# Patient Record
Sex: MALE | Race: WHITE | NOT HISPANIC OR LATINO | Employment: OTHER | URBAN - METROPOLITAN AREA
[De-identification: names, ages, dates, MRNs, and addresses within clinical notes are randomized per-mention and may not be internally consistent; named-entity substitution may affect disease eponyms.]

---

## 2017-02-23 ENCOUNTER — HOSPITAL ENCOUNTER (OUTPATIENT)
Dept: FAMILY MEDICINE CLINIC | Facility: CLINIC | Age: 57
Discharge: HOME | End: 2017-02-23
Attending: NURSE PRACTITIONER | Admitting: NURSE PRACTITIONER

## 2017-03-29 ENCOUNTER — HOSPITAL ENCOUNTER (OUTPATIENT)
Dept: FAMILY MEDICINE CLINIC | Facility: CLINIC | Age: 57
Discharge: HOME | End: 2017-03-29
Attending: NURSE PRACTITIONER | Admitting: NURSE PRACTITIONER

## 2017-03-31 ENCOUNTER — HOSPITAL ENCOUNTER (OUTPATIENT)
Dept: FAMILY MEDICINE CLINIC | Facility: CLINIC | Age: 57
Discharge: HOME | End: 2017-03-31
Attending: NURSE PRACTITIONER | Admitting: NURSE PRACTITIONER

## 2018-02-28 ENCOUNTER — HOSPITAL ENCOUNTER (OUTPATIENT)
Dept: FAMILY MEDICINE CLINIC | Facility: CLINIC | Age: 58
Discharge: HOME | End: 2018-02-28
Attending: NURSE PRACTITIONER | Admitting: NURSE PRACTITIONER

## 2019-07-30 RX ORDER — ATORVASTATIN CALCIUM 20 MG/1
20 TABLET, FILM COATED ORAL
Qty: 30 TABLET | Refills: 5 | Status: SHIPPED | OUTPATIENT
Start: 2019-07-30 | End: 2020-01-20

## 2019-07-30 RX ORDER — ATORVASTATIN CALCIUM 20 MG/1
1 TABLET, FILM COATED ORAL
COMMUNITY
Start: 2017-11-11 | End: 2019-07-30 | Stop reason: SDUPTHER

## 2019-08-08 RX ORDER — LISINOPRIL 10 MG/1
1 TABLET ORAL DAILY
COMMUNITY
Start: 2017-11-27 | End: 2019-08-08 | Stop reason: SDUPTHER

## 2019-08-08 RX ORDER — LISINOPRIL 10 MG/1
10 TABLET ORAL DAILY
Qty: 30 TABLET | Refills: 5 | Status: SHIPPED | OUTPATIENT
Start: 2019-08-08 | End: 2020-01-27

## 2019-08-13 RX ORDER — MELOXICAM 15 MG/1
1 TABLET ORAL DAILY
COMMUNITY
Start: 2018-05-18 | End: 2019-08-13 | Stop reason: SDUPTHER

## 2019-08-13 RX ORDER — MELOXICAM 15 MG/1
15 TABLET ORAL DAILY
Qty: 30 TABLET | Refills: 2 | Status: SHIPPED | OUTPATIENT
Start: 2019-08-13 | End: 2019-11-20 | Stop reason: SDUPTHER

## 2019-10-14 RX ORDER — GLIPIZIDE 5 MG/1
0.5 TABLET ORAL DAILY
COMMUNITY
Start: 2017-12-24 | End: 2019-10-14 | Stop reason: SDUPTHER

## 2019-10-14 RX ORDER — GLIPIZIDE 5 MG/1
2.5 TABLET ORAL DAILY
Qty: 45 TABLET | Refills: 1 | Status: SHIPPED | OUTPATIENT
Start: 2019-10-14 | End: 2020-03-04

## 2019-11-20 RX ORDER — MELOXICAM 15 MG/1
TABLET ORAL
Qty: 30 TABLET | Refills: 2 | Status: SHIPPED | OUTPATIENT
Start: 2019-11-20 | End: 2020-02-26 | Stop reason: SDUPTHER

## 2020-01-20 RX ORDER — ATORVASTATIN CALCIUM 20 MG/1
TABLET, FILM COATED ORAL
Qty: 30 TABLET | Refills: 5 | Status: SHIPPED | OUTPATIENT
Start: 2020-01-20 | End: 2020-08-11 | Stop reason: SDUPTHER

## 2020-01-27 RX ORDER — LISINOPRIL 10 MG/1
TABLET ORAL
Qty: 30 TABLET | Refills: 5 | Status: SHIPPED | OUTPATIENT
Start: 2020-01-27 | End: 2020-08-03 | Stop reason: SDUPTHER

## 2020-02-10 RX ORDER — GLIPIZIDE 5 MG/1
TABLET ORAL
Qty: 45 TABLET | Refills: 1 | OUTPATIENT
Start: 2020-02-10

## 2020-02-13 NOTE — TELEPHONE ENCOUNTER
Tried to contact patient- No voicemail set up couldn't leave message, will continue to try to contact patient

## 2020-02-18 RX ORDER — MELOXICAM 15 MG/1
TABLET ORAL
Qty: 30 TABLET | Refills: 2 | OUTPATIENT
Start: 2020-02-18

## 2020-02-19 ENCOUNTER — OFFICE VISIT (OUTPATIENT)
Dept: FAMILY MEDICINE CLINIC | Facility: CLINIC | Age: 60
End: 2020-02-19

## 2020-02-19 VITALS
HEIGHT: 66 IN | DIASTOLIC BLOOD PRESSURE: 85 MMHG | TEMPERATURE: 98.1 F | SYSTOLIC BLOOD PRESSURE: 124 MMHG | OXYGEN SATURATION: 90 % | WEIGHT: 272 LBS | BODY MASS INDEX: 43.71 KG/M2 | HEART RATE: 79 BPM

## 2020-02-19 DIAGNOSIS — K59.00 CONSTIPATION, UNSPECIFIED CONSTIPATION TYPE: ICD-10-CM

## 2020-02-19 DIAGNOSIS — R10.30 LOWER ABDOMINAL PAIN: Primary | ICD-10-CM

## 2020-02-19 PROBLEM — K21.9 GASTROESOPHAGEAL REFLUX DISEASE: Status: ACTIVE | Noted: 2017-03-29

## 2020-02-19 PROBLEM — R51.9 HEADACHE: Status: ACTIVE | Noted: 2017-06-22

## 2020-02-19 PROBLEM — M25.532 WRIST PAIN, LEFT: Status: ACTIVE | Noted: 2018-02-26

## 2020-02-19 PROBLEM — Z12.5 ENCOUNTER FOR SCREENING FOR MALIGNANT NEOPLASM OF PROSTATE: Status: ACTIVE | Noted: 2019-05-09

## 2020-02-19 PROBLEM — M25.569 KNEE PAIN: Status: ACTIVE | Noted: 2017-02-15

## 2020-02-19 PROBLEM — H57.12 EYE PAIN, LEFT: Status: ACTIVE | Noted: 2017-06-22

## 2020-02-19 PROBLEM — R07.81 RIB PAIN: Status: ACTIVE | Noted: 2017-03-29
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
## 2020-02-19 PROCEDURE — 99213 OFFICE O/P EST LOW 20 MIN: CPT | Performed by: NURSE PRACTITIONER

## 2020-02-19 NOTE — PROGRESS NOTES
"    George Damon is a 59 y.o. male.      59-year-old obese white male with history of COPD, CAD/hypertension, type 2 diabetes, CHF, hyperlipidemia who comes in today with complaints of severe abdominal pain lasting 3-4 days that is doubling patient over in the office.  He states he had has not had a bowel movement 2 weeks and normally has daily movements and is not taking anything for constipation. He complains of not being able to eat or drink anything for a couple of days.  I am concerned for bowel obstructions since patient is having such severe pain in his never had issues with constipation before so I am sending him to the ER for evaluation.  Patient's vital signs are stable.  I called the emergency room and give report       The following portions of the patient's history were reviewed and updated as appropriate: allergies, current medications, past family history, past medical history, past social history, past surgical history and problem list.    Vitals:    02/19/20 1015   BP: 124/85   BP Location: Right arm   Patient Position: Sitting   Cuff Size: Large Adult   Pulse: 79   Temp: 98.1 °F (36.7 °C)   TempSrc: Oral   SpO2: 90%   Weight: 123 kg (272 lb)   Height: 167.6 cm (66\")     Body mass index is 43.9 kg/m².    Past Medical History:   Diagnosis Date   • Accidental fall    • Acute respiratory failure with hypercapnia (CMS/HCC)    • CAD (coronary artery disease)    • Congestive heart failure (CMS/HCC)    • COPD with acute exacerbation (CMS/HCC)    • Diabetes mellitus, type II (CMS/HCC)    • Diastolic dysfunction    • Enlarged heart    • Eye pain, left    • GERD (gastroesophageal reflux disease)     (gastric reflex)   • Headache    • Hyperlipidemia    • Hypertension    • Knee pain, left    • Lumbar back pain     With radiculopathy   • Nicotine abuse     Dependence   • Obesity    • Onychauxis    • Pain in joint of right knee    • Pneumonia, bacterial    • Rib pain on right side    • Toe infection    • Wrist " pain, left      Past Surgical History:   Procedure Laterality Date   • CARDIAC CATHETERIZATION  08/18/2015     Family History   Problem Relation Age of Onset   • Diabetes Other    • Cancer Other    • Heart disease Father        There is no immunization history on file for this patient.    Abstract on 02/17/2020   Component Date Value Ref Range Status   • HM EYE EXAM 01/26/2018 SEE REPORT   Final         Review of Systems   Constitutional: Negative.    HENT: Negative.    Respiratory: Negative.    Cardiovascular: Negative.    Gastrointestinal: Positive for abdominal distention, abdominal pain, constipation and nausea.   Musculoskeletal: Negative.    Skin: Negative.    Neurological: Negative.    Psychiatric/Behavioral: Negative.        Objective   Physical Exam   Constitutional: He is oriented to person, place, and time. He appears well-developed and well-nourished.   Cardiovascular: Normal rate and regular rhythm.   Pulmonary/Chest: Effort normal and breath sounds normal.   Abdominal:   Patient has a very large abdomen but it does seem more distended and he is having severe pain that doubles him over.  Has also not had any p.o. Intake for several days   Musculoskeletal: Normal range of motion.   Neurological: He is alert and oriented to person, place, and time.   Skin: Skin is warm and dry.   Psychiatric: He has a normal mood and affect.       Procedures    Assessment/Plan   George was seen today for abdominal pain, diabetes, hypertension, copd, hyperlipidemia and constipation.    Diagnoses and all orders for this visit:    Lower abdominal pain    Constipation, unspecified constipation type          Current Outpatient Medications:   •  atorvastatin (LIPITOR) 20 MG tablet, TAKE 1 TABLET BY MOUTH EVERYDAY AT BEDTIME, Disp: 30 tablet, Rfl: 5  •  glipizide (GLUCOTROL) 5 MG tablet, Take 0.5 tablets by mouth Daily., Disp: 45 tablet, Rfl: 1  •  glucose blood (ACCU-CHEK CHEKO PLUS) test strip, Check BS 3 times a day, Disp:  100 each, Rfl: 5  •  lisinopril (PRINIVIL,ZESTRIL) 10 MG tablet, TAKE 1 TABLET BY MOUTH EVERY DAY, Disp: 30 tablet, Rfl: 5  •  meloxicam (MOBIC) 15 MG tablet, TAKE 1 TABLET BY MOUTH EVERY DAY, Disp: 30 tablet, Rfl: 2  •  insulin aspart (NOVOLOG FLEXPEN) 100 UNIT/ML solution pen-injector sc pen, Sliding Scale, Disp: , Rfl:   •  SITagliptin (JANUVIA) 25 MG tablet, Take 1 tablet by mouth Daily., Disp: , Rfl:

## 2020-02-26 RX ORDER — MELOXICAM 15 MG/1
15 TABLET ORAL DAILY
Qty: 90 TABLET | Refills: 1 | Status: SHIPPED | OUTPATIENT
Start: 2020-02-26 | End: 2020-08-11 | Stop reason: SDUPTHER

## 2020-03-04 RX ORDER — GLIPIZIDE 5 MG/1
TABLET ORAL
Qty: 45 TABLET | Refills: 1 | Status: SHIPPED | OUTPATIENT
Start: 2020-03-04 | End: 2020-08-11 | Stop reason: SDUPTHER

## 2020-03-31 RX ORDER — LANCING DEVICE/LANCETS
KIT MISCELLANEOUS
Qty: 1 KIT | Refills: 0 | Status: SHIPPED | OUTPATIENT
Start: 2020-03-31 | End: 2021-11-10 | Stop reason: SDUPTHER

## 2020-03-31 RX ORDER — LANCING DEVICE/LANCETS
KIT MISCELLANEOUS
COMMUNITY
End: 2020-03-31 | Stop reason: SDUPTHER

## 2020-07-28 RX ORDER — ATORVASTATIN CALCIUM 20 MG/1
TABLET, FILM COATED ORAL
Qty: 30 TABLET | Refills: 5 | OUTPATIENT
Start: 2020-07-28

## 2020-07-28 RX ORDER — LISINOPRIL 10 MG/1
TABLET ORAL
Qty: 30 TABLET | Refills: 5 | OUTPATIENT
Start: 2020-07-28

## 2020-08-01 RX ORDER — LISINOPRIL 10 MG/1
TABLET ORAL
Qty: 30 TABLET | Refills: 5 | OUTPATIENT
Start: 2020-08-01

## 2020-08-03 RX ORDER — LISINOPRIL 10 MG/1
10 TABLET ORAL DAILY
Qty: 30 TABLET | Refills: 0 | Status: SHIPPED | OUTPATIENT
Start: 2020-08-03 | End: 2020-08-11 | Stop reason: SDUPTHER

## 2020-08-03 RX ORDER — LISINOPRIL 10 MG/1
TABLET ORAL
Qty: 30 TABLET | Refills: 5 | OUTPATIENT
Start: 2020-08-03

## 2020-08-11 ENCOUNTER — OFFICE VISIT (OUTPATIENT)
Dept: FAMILY MEDICINE CLINIC | Facility: CLINIC | Age: 60
End: 2020-08-11

## 2020-08-11 VITALS
OXYGEN SATURATION: 88 % | HEIGHT: 66 IN | DIASTOLIC BLOOD PRESSURE: 85 MMHG | SYSTOLIC BLOOD PRESSURE: 123 MMHG | TEMPERATURE: 97.1 F | WEIGHT: 280 LBS | HEART RATE: 62 BPM | BODY MASS INDEX: 45 KG/M2

## 2020-08-11 DIAGNOSIS — Z00.00 PREVENTATIVE HEALTH CARE: ICD-10-CM

## 2020-08-11 DIAGNOSIS — Z79.4 TYPE 2 DIABETES MELLITUS WITH HYPERGLYCEMIA, WITH LONG-TERM CURRENT USE OF INSULIN (HCC): ICD-10-CM

## 2020-08-11 DIAGNOSIS — E11.65 TYPE 2 DIABETES MELLITUS WITH HYPERGLYCEMIA, WITH LONG-TERM CURRENT USE OF INSULIN (HCC): ICD-10-CM

## 2020-08-11 DIAGNOSIS — I10 ESSENTIAL HYPERTENSION: ICD-10-CM

## 2020-08-11 DIAGNOSIS — Z12.5 SCREENING PSA (PROSTATE SPECIFIC ANTIGEN): ICD-10-CM

## 2020-08-11 DIAGNOSIS — F17.219 CIGARETTE NICOTINE DEPENDENCE WITH NICOTINE-INDUCED DISORDER: ICD-10-CM

## 2020-08-11 DIAGNOSIS — E78.2 MIXED HYPERLIPIDEMIA: Primary | ICD-10-CM

## 2020-08-11 PROCEDURE — 99214 OFFICE O/P EST MOD 30 MIN: CPT | Performed by: NURSE PRACTITIONER

## 2020-08-11 RX ORDER — LISINOPRIL 10 MG/1
10 TABLET ORAL DAILY
Qty: 90 TABLET | Refills: 1 | Status: SHIPPED | OUTPATIENT
Start: 2020-08-11 | End: 2021-01-23

## 2020-08-11 RX ORDER — ALBUTEROL SULFATE 90 UG/1
2 AEROSOL, METERED RESPIRATORY (INHALATION) EVERY 4 HOURS PRN
Qty: 3 INHALER | Refills: 1 | Status: SHIPPED | OUTPATIENT
Start: 2020-08-11 | End: 2021-11-10 | Stop reason: SDUPTHER

## 2020-08-11 RX ORDER — ATORVASTATIN CALCIUM 20 MG/1
20 TABLET, FILM COATED ORAL
Qty: 90 TABLET | Refills: 1 | Status: SHIPPED | OUTPATIENT
Start: 2020-08-11 | End: 2021-05-17

## 2020-08-11 RX ORDER — MELOXICAM 15 MG/1
15 TABLET ORAL DAILY
Qty: 90 TABLET | Refills: 1 | Status: SHIPPED | OUTPATIENT
Start: 2020-08-11 | End: 2021-11-10

## 2020-08-11 RX ORDER — GLIPIZIDE 5 MG/1
2.5 TABLET ORAL DAILY
Qty: 45 TABLET | Refills: 1 | Status: SHIPPED | OUTPATIENT
Start: 2020-08-11 | End: 2021-03-03

## 2020-08-11 RX ORDER — ALBUTEROL SULFATE 90 UG/1
2 AEROSOL, METERED RESPIRATORY (INHALATION) EVERY 4 HOURS PRN
COMMUNITY
End: 2020-08-11 | Stop reason: SDUPTHER

## 2020-08-11 NOTE — PATIENT INSTRUCTIONS
Fasting blood work today  Diet and exercise as discussed  Follow-up fasting visit 6 months  Stop smoking , consider smoking cessation

## 2020-08-11 NOTE — PROGRESS NOTES
"    George Damon is a 60 y.o. male.     61 yo obese white male smoker with hx of diabetes, copd, cad/htn, chf, hyperlipidemia who comes in today for f/u visit and fasting blood work  Blood pressure 122/84 heart rate 62 he denies any chest pain, dyspnea, tachycardia or dizziness  Patient states blood sugars are usually under 130 fasting in the morning his last A1c was 7.1 fasting blood sugar 166  Weight is up 8 pounds at 280 we discussed diet and weight loss but patient is not real compliant with diet  He is up-to-date on eye exams I am doing a PSA today he still refuses colonoscopies    Fasting blood work  Follow-up 6 months fasting   diet and exercise as discussed         The following portions of the patient's history were reviewed and updated as appropriate: allergies, current medications, past family history, past medical history, past social history, past surgical history and problem list.    Vitals:    08/11/20 0809   BP: 123/85   BP Location: Right arm   Patient Position: Sitting   Cuff Size: Large Adult   Pulse: 62   Temp: 97.1 °F (36.2 °C)   TempSrc: Temporal   SpO2: (!) 88%   Weight: 127 kg (280 lb)   Height: 167.6 cm (66\")     Body mass index is 45.19 kg/m².    Past Medical History:   Diagnosis Date   • Accidental fall    • Acute respiratory failure with hypercapnia (CMS/HCC)    • CAD (coronary artery disease)    • Congestive heart failure (CMS/HCC)    • COPD with acute exacerbation (CMS/HCC)    • Diabetes mellitus, type II (CMS/HCC)    • Diastolic dysfunction    • Enlarged heart    • Eye pain, left    • GERD (gastroesophageal reflux disease)     (gastric reflex)   • Headache    • Hyperlipidemia    • Hypertension    • Knee pain, left    • Lumbar back pain     With radiculopathy   • Nicotine abuse     Dependence   • Obesity    • Onychauxis    • Pain in joint of right knee    • Pneumonia, bacterial    • Rib pain on right side    • Toe infection    • Wrist pain, left      Past Surgical History: "   Procedure Laterality Date   • CARDIAC CATHETERIZATION  08/18/2015     Family History   Problem Relation Age of Onset   • Diabetes Other    • Cancer Other    • Heart disease Father        There is no immunization history on file for this patient.    Abstract on 02/17/2020   Component Date Value Ref Range Status   • HM EYE EXAM 01/26/2018 SEE REPORT   Final         Review of Systems   Constitutional: Negative.    HENT: Negative.    Respiratory: Negative.    Cardiovascular: Negative.    Gastrointestinal: Negative.    Genitourinary: Negative.    Musculoskeletal: Negative.    Neurological: Negative.    Psychiatric/Behavioral: Negative.        Objective   Physical Exam   Constitutional: He is oriented to person, place, and time. He appears well-developed and well-nourished.   Cardiovascular: Normal rate and regular rhythm.   Pulmonary/Chest: Effort normal and breath sounds normal.   Abdominal: Soft. Bowel sounds are normal.   Musculoskeletal: Normal range of motion.   Neurological: He is alert and oriented to person, place, and time.   Skin: Skin is warm and dry.   Psychiatric: He has a normal mood and affect.       Procedures    Assessment/Plan   George was seen today for abdominal pain.    Diagnoses and all orders for this visit:    Mixed hyperlipidemia  -     Lipid Panel With LDL / HDL Ratio    Type 2 diabetes mellitus with hyperglycemia, with long-term current use of insulin (CMS/[facility])  -     Comprehensive Metabolic Panel  -     Hemoglobin A1c    Screening PSA (prostate specific antigen)  -     PSA Screen    Preventative health care  -     CBC & Differential    Essential hypertension    Cigarette nicotine dependence with nicotine-induced disorder    Body mass index (BMI) 45.0-49.9, adult (CMS/[facility])    Other orders  -     lisinopril (PRINIVIL,ZESTRIL) 10 MG tablet; Take 1 tablet by mouth Daily.  -     atorvastatin (LIPITOR) 20 MG tablet; Take 1 tablet by mouth every night at bedtime.  -     glipizide (GLUCOTROL) 5 MG  tablet; Take 0.5 tablets by mouth Daily.  -     meloxicam (MOBIC) 15 MG tablet; Take 1 tablet by mouth Daily.  -     SITagliptin (Januvia) 25 MG tablet; Take 1 tablet by mouth Daily.  -     albuterol sulfate  (90 Base) MCG/ACT inhaler; Inhale 2 puffs Every 4 (Four) Hours As Needed for Wheezing.          Current Outpatient Medications:   •  albuterol sulfate  (90 Base) MCG/ACT inhaler, Inhale 2 puffs Every 4 (Four) Hours As Needed for Wheezing., Disp: 3 inhaler, Rfl: 1  •  atorvastatin (LIPITOR) 20 MG tablet, Take 1 tablet by mouth every night at bedtime., Disp: 90 tablet, Rfl: 1  •  glipizide (GLUCOTROL) 5 MG tablet, Take 0.5 tablets by mouth Daily., Disp: 45 tablet, Rfl: 1  •  glucose blood (Accu-Chek Ania Plus) test strip, USE TO CHECK BLOOD SUGAR THREE TIMES A DAY, Disp: 300 each, Rfl: 5  •  insulin aspart (NOVOLOG FLEXPEN) 100 UNIT/ML solution pen-injector sc pen, Sliding Scale, Disp: , Rfl:   •  Insulin Pen Needle (B-D ULTRAFINE III SHORT PEN) 31G X 8 MM misc, Use as directed with Insulin, Disp: 300 each, Rfl: 2  •  Lancets Misc. (ACCU-CHEK FASTCLIX LANCET) kit, Accu-Chek Fastclix Lancet Device Check BS 3 times a day, Disp: 1 kit, Rfl: 0  •  lisinopril (PRINIVIL,ZESTRIL) 10 MG tablet, Take 1 tablet by mouth Daily., Disp: 90 tablet, Rfl: 1  •  meloxicam (MOBIC) 15 MG tablet, Take 1 tablet by mouth Daily., Disp: 90 tablet, Rfl: 1  •  SITagliptin (Januvia) 25 MG tablet, Take 1 tablet by mouth Daily., Disp: 90 tablet, Rfl: 1

## 2020-08-12 LAB
ALBUMIN SERPL-MCNC: 4.5 G/DL (ref 3.8–4.9)
ALBUMIN/GLOB SERPL: 2.1 {RATIO} (ref 1.2–2.2)
ALP SERPL-CCNC: 102 IU/L (ref 39–117)
ALT SERPL-CCNC: 13 IU/L (ref 0–44)
AST SERPL-CCNC: 17 IU/L (ref 0–40)
BASOPHILS # BLD AUTO: 0 X10E3/UL (ref 0–0.2)
BASOPHILS NFR BLD AUTO: 0 %
BILIRUB SERPL-MCNC: <0.2 MG/DL (ref 0–1.2)
BUN SERPL-MCNC: 11 MG/DL (ref 6–24)
BUN/CREAT SERPL: 15 (ref 9–20)
CALCIUM SERPL-MCNC: 9.9 MG/DL (ref 8.7–10.2)
CHLORIDE SERPL-SCNC: 102 MMOL/L (ref 96–106)
CHOLEST SERPL-MCNC: 143 MG/DL (ref 100–199)
CO2 SERPL-SCNC: 28 MMOL/L (ref 20–29)
CREAT SERPL-MCNC: 0.73 MG/DL (ref 0.76–1.27)
EOSINOPHIL # BLD AUTO: 0.1 X10E3/UL (ref 0–0.4)
EOSINOPHIL NFR BLD AUTO: 2 %
ERYTHROCYTE [DISTWIDTH] IN BLOOD BY AUTOMATED COUNT: 15.3 % (ref 11.6–15.4)
GLOBULIN SER CALC-MCNC: 2.1 G/DL (ref 1.5–4.5)
GLUCOSE SERPL-MCNC: 95 MG/DL (ref 65–99)
HBA1C MFR BLD: 7.3 % (ref 4.8–5.6)
HCT VFR BLD AUTO: 44.5 % (ref 37.5–51)
HDLC SERPL-MCNC: 49 MG/DL
HGB BLD-MCNC: 14.4 G/DL (ref 13–17.7)
IMM GRANULOCYTES # BLD AUTO: 0 X10E3/UL (ref 0–0.1)
IMM GRANULOCYTES NFR BLD AUTO: 0 %
LDLC SERPL CALC-MCNC: 63 MG/DL (ref 0–99)
LDLC/HDLC SERPL: 1.3 RATIO (ref 0–3.6)
LYMPHOCYTES # BLD AUTO: 1.4 X10E3/UL (ref 0.7–3.1)
LYMPHOCYTES NFR BLD AUTO: 24 %
MCH RBC QN AUTO: 28.2 PG (ref 26.6–33)
MCHC RBC AUTO-ENTMCNC: 32.4 G/DL (ref 31.5–35.7)
MCV RBC AUTO: 87 FL (ref 79–97)
MONOCYTES # BLD AUTO: 0.5 X10E3/UL (ref 0.1–0.9)
MONOCYTES NFR BLD AUTO: 9 %
NEUTROPHILS # BLD AUTO: 3.8 X10E3/UL (ref 1.4–7)
NEUTROPHILS NFR BLD AUTO: 65 %
PLATELET # BLD AUTO: 251 X10E3/UL (ref 150–450)
POTASSIUM SERPL-SCNC: 5 MMOL/L (ref 3.5–5.2)
PROT SERPL-MCNC: 6.6 G/DL (ref 6–8.5)
PSA SERPL-MCNC: 0.2 NG/ML (ref 0–4)
RBC # BLD AUTO: 5.1 X10E6/UL (ref 4.14–5.8)
SODIUM SERPL-SCNC: 143 MMOL/L (ref 134–144)
TRIGL SERPL-MCNC: 156 MG/DL (ref 0–149)
VLDLC SERPL CALC-MCNC: 31 MG/DL (ref 5–40)
WBC # BLD AUTO: 6 X10E3/UL (ref 3.4–10.8)

## 2021-01-23 RX ORDER — LISINOPRIL 10 MG/1
TABLET ORAL
Qty: 90 TABLET | Refills: 1 | Status: SHIPPED | OUTPATIENT
Start: 2021-01-23 | End: 2021-11-10 | Stop reason: SDUPTHER

## 2021-03-03 RX ORDER — GLIPIZIDE 5 MG/1
2.5 TABLET ORAL DAILY
Qty: 15 TABLET | Refills: 0 | Status: SHIPPED | OUTPATIENT
Start: 2021-03-03 | End: 2021-11-10 | Stop reason: SDUPTHER

## 2021-05-17 RX ORDER — ATORVASTATIN CALCIUM 20 MG/1
20 TABLET, FILM COATED ORAL
Qty: 30 TABLET | Refills: 0 | Status: SHIPPED | OUTPATIENT
Start: 2021-05-17 | End: 2021-11-10 | Stop reason: SDUPTHER

## 2021-05-17 RX ORDER — BLOOD SUGAR DIAGNOSTIC
STRIP MISCELLANEOUS
Qty: 100 EACH | Refills: 17 | Status: SHIPPED | OUTPATIENT
Start: 2021-05-17 | End: 2021-11-10 | Stop reason: SDUPTHER

## 2021-06-10 PROBLEM — E11.9 DIABETES MELLITUS TYPE 2, INSULIN DEPENDENT: Status: ACTIVE | Noted: 2021-06-10

## 2021-06-10 PROBLEM — Z79.4 DIABETES MELLITUS TYPE 2, INSULIN DEPENDENT (HCC): Status: ACTIVE | Noted: 2021-06-10

## 2021-08-05 RX ORDER — ATORVASTATIN CALCIUM 20 MG/1
TABLET, FILM COATED ORAL
Qty: 30 TABLET | Refills: 0 | OUTPATIENT
Start: 2021-08-05

## 2021-08-05 RX ORDER — GLIPIZIDE 5 MG/1
2.5 TABLET ORAL DAILY
Qty: 15 TABLET | Refills: 0 | OUTPATIENT
Start: 2021-08-05

## 2021-08-22 RX ORDER — ATORVASTATIN CALCIUM 20 MG/1
TABLET, FILM COATED ORAL
Qty: 30 TABLET | Refills: 0 | OUTPATIENT
Start: 2021-08-22

## 2021-08-22 RX ORDER — GLIPIZIDE 5 MG/1
2.5 TABLET ORAL DAILY
Qty: 15 TABLET | Refills: 0 | OUTPATIENT
Start: 2021-08-22

## 2021-10-04 RX ORDER — LISINOPRIL 10 MG/1
TABLET ORAL
Qty: 90 TABLET | Refills: 1 | OUTPATIENT
Start: 2021-10-04

## 2021-11-10 ENCOUNTER — OFFICE VISIT (OUTPATIENT)
Dept: FAMILY MEDICINE CLINIC | Facility: CLINIC | Age: 61
End: 2021-11-10

## 2021-11-10 VITALS
SYSTOLIC BLOOD PRESSURE: 147 MMHG | HEIGHT: 66 IN | TEMPERATURE: 97.3 F | HEART RATE: 71 BPM | OXYGEN SATURATION: 90 % | DIASTOLIC BLOOD PRESSURE: 84 MMHG | BODY MASS INDEX: 43.1 KG/M2 | WEIGHT: 268.2 LBS

## 2021-11-10 DIAGNOSIS — Z79.4 DIABETES MELLITUS TYPE 2, INSULIN DEPENDENT (HCC): ICD-10-CM

## 2021-11-10 DIAGNOSIS — Z12.5 SCREENING PSA (PROSTATE SPECIFIC ANTIGEN): ICD-10-CM

## 2021-11-10 DIAGNOSIS — E11.9 DIABETES MELLITUS TYPE 2, INSULIN DEPENDENT (HCC): ICD-10-CM

## 2021-11-10 DIAGNOSIS — E78.2 MIXED HYPERLIPIDEMIA: Primary | ICD-10-CM

## 2021-11-10 DIAGNOSIS — F17.219 CIGARETTE NICOTINE DEPENDENCE WITH NICOTINE-INDUCED DISORDER: ICD-10-CM

## 2021-11-10 DIAGNOSIS — Z00.00 PREVENTATIVE HEALTH CARE: ICD-10-CM

## 2021-11-10 PROCEDURE — 99396 PREV VISIT EST AGE 40-64: CPT | Performed by: NURSE PRACTITIONER

## 2021-11-10 RX ORDER — ALBUTEROL SULFATE 90 UG/1
2 AEROSOL, METERED RESPIRATORY (INHALATION) EVERY 4 HOURS PRN
Qty: 18.2 G | Refills: 2 | Status: SHIPPED | OUTPATIENT
Start: 2021-11-10 | End: 2022-08-01 | Stop reason: SDUPTHER

## 2021-11-10 RX ORDER — ATORVASTATIN CALCIUM 20 MG/1
20 TABLET, FILM COATED ORAL
Qty: 90 TABLET | Refills: 1 | Status: SHIPPED | OUTPATIENT
Start: 2021-11-10 | End: 2022-05-12

## 2021-11-10 RX ORDER — PEN NEEDLE, DIABETIC 31 GX5/16"
NEEDLE, DISPOSABLE MISCELLANEOUS
Qty: 300 EACH | Refills: 2 | Status: SHIPPED | OUTPATIENT
Start: 2021-11-10

## 2021-11-10 RX ORDER — GLIPIZIDE 5 MG/1
TABLET ORAL
Qty: 45 TABLET | Refills: 1 | Status: SHIPPED | OUTPATIENT
Start: 2021-11-10 | End: 2022-04-04

## 2021-11-10 RX ORDER — LANCING DEVICE/LANCETS
KIT MISCELLANEOUS
Qty: 1 KIT | Refills: 0 | Status: SHIPPED | OUTPATIENT
Start: 2021-11-10 | End: 2022-08-09

## 2021-11-10 RX ORDER — LISINOPRIL 10 MG/1
10 TABLET ORAL DAILY
Qty: 90 TABLET | Refills: 1 | Status: SHIPPED | OUTPATIENT
Start: 2021-11-10 | End: 2022-05-12

## 2021-11-10 RX ORDER — BLOOD SUGAR DIAGNOSTIC
STRIP MISCELLANEOUS
Qty: 100 EACH | Refills: 17 | Status: SHIPPED | OUTPATIENT
Start: 2021-11-10 | End: 2022-08-01 | Stop reason: SDUPTHER

## 2021-11-10 NOTE — PROGRESS NOTES
"    George Damon is a 61 y.o. male.     61-year-old obese white male smoker with history of diabetes, COPD, CAD/hypertension, CHF, hyperlipidemia who comes in today for annual visit.  Patient has been out of medications for 4 to 5 months stating he was unable to get through the office to make an appointment    Blood pressure 146/84 heart rate 70 he denies any chest pain, dyspnea, tachycardia or dizziness    Patient states blood sugars have been higher obviously being off his glipizide.  His last A1c was 7.3 fasting blood sugar 95 and triglycerides 156 I will be reordering all medicines today as  wellness fasting blood work    Patient is down 12 pounds with a weight of 268 and a BMI of 43.3 and he states he has been trying to lose weight    Patient has not got Covid vaccines he is up-to-date on eye exam I am doing a PSA today and he still refuses colonoscopy or Cologuard              Fasting blood work  Monitor blood sugars closely  Reconsider Covid vaccine and colonoscopy  Make 6-month follow-up appointment today       The following portions of the patient's history were reviewed and updated as appropriate: allergies, current medications, past family history, past medical history, past social history, past surgical history and problem list.    Vitals:    11/10/21 1157   BP: 147/84   BP Location: Right arm   Patient Position: Sitting   Cuff Size: Large Adult   Pulse: 71   Temp: 97.3 °F (36.3 °C)   TempSrc: Temporal   SpO2: 90%   Weight: 122 kg (268 lb 3.2 oz)   Height: 167.6 cm (66\")     Body mass index is 43.29 kg/m².    Past Medical History:   Diagnosis Date   • Accidental fall    • Acute respiratory failure with hypercapnia (HCC)    • CAD (coronary artery disease)    • Congestive heart failure (HCC)    • COPD with acute exacerbation (HCC)    • Diabetes mellitus, type II (HCC)    • Diastolic dysfunction    • Enlarged heart    • Eye pain, left    • GERD (gastroesophageal reflux disease)     (gastric reflex)   • " Headache    • Hyperlipidemia    • Hypertension    • Knee pain, left    • Lumbar back pain     With radiculopathy   • Nicotine abuse     Dependence   • Obesity    • Onychauxis    • Pain in joint of right knee    • Pneumonia, bacterial    • Rib pain on right side    • Toe infection    • Wrist pain, left      Past Surgical History:   Procedure Laterality Date   • CARDIAC CATHETERIZATION  08/18/2015     Family History   Problem Relation Age of Onset   • Diabetes Other    • Cancer Other    • Heart disease Father        There is no immunization history on file for this patient.    Office Visit on 08/11/2020   Component Date Value Ref Range Status   • WBC 08/11/2020 6.0  3.4 - 10.8 x10E3/uL Final   • RBC 08/11/2020 5.10  4.14 - 5.80 x10E6/uL Final   • Hemoglobin 08/11/2020 14.4  13.0 - 17.7 g/dL Final   • Hematocrit 08/11/2020 44.5  37.5 - 51.0 % Final   • MCV 08/11/2020 87  79 - 97 fL Final   • MCH 08/11/2020 28.2  26.6 - 33.0 pg Final   • MCHC 08/11/2020 32.4  31.5 - 35.7 g/dL Final   • RDW 08/11/2020 15.3  11.6 - 15.4 % Final   • Platelets 08/11/2020 251  150 - 450 x10E3/uL Final   • Neutrophil Rel % 08/11/2020 65  Not Estab. % Final   • Lymphocyte Rel % 08/11/2020 24  Not Estab. % Final   • Monocyte Rel % 08/11/2020 9  Not Estab. % Final   • Eosinophil Rel % 08/11/2020 2  Not Estab. % Final   • Basophil Rel % 08/11/2020 0  Not Estab. % Final   • Neutrophils Absolute 08/11/2020 3.8  1.4 - 7.0 x10E3/uL Final   • Lymphocytes Absolute 08/11/2020 1.4  0.7 - 3.1 x10E3/uL Final   • Monocytes Absolute 08/11/2020 0.5  0.1 - 0.9 x10E3/uL Final   • Eosinophils Absolute 08/11/2020 0.1  0.0 - 0.4 x10E3/uL Final   • Basophils Absolute 08/11/2020 0.0  0.0 - 0.2 x10E3/uL Final   • Immature Granulocyte Rel % 08/11/2020 0  Not Estab. % Final   • Immature Grans Absolute 08/11/2020 0.0  0.0 - 0.1 x10E3/uL Final   • Glucose 08/11/2020 95  65 - 99 mg/dL Final   • BUN 08/11/2020 11  6 - 24 mg/dL Final   • Creatinine 08/11/2020 0.73* 0.76 -  1.27 mg/dL Final   • eGFR Non African Am 08/11/2020 102  >59 mL/min/1.73 Final   • eGFR African Am 08/11/2020 118  >59 mL/min/1.73 Final   • BUN/Creatinine Ratio 08/11/2020 15  9 - 20 Final   • Sodium 08/11/2020 143  134 - 144 mmol/L Final   • Potassium 08/11/2020 5.0  3.5 - 5.2 mmol/L Final   • Chloride 08/11/2020 102  96 - 106 mmol/L Final   • Total CO2 08/11/2020 28  20 - 29 mmol/L Final   • Calcium 08/11/2020 9.9  8.7 - 10.2 mg/dL Final   • Total Protein 08/11/2020 6.6  6.0 - 8.5 g/dL Final   • Albumin 08/11/2020 4.5  3.8 - 4.9 g/dL Final   • Globulin 08/11/2020 2.1  1.5 - 4.5 g/dL Final   • A/G Ratio 08/11/2020 2.1  1.2 - 2.2 Final   • Total Bilirubin 08/11/2020 <0.2  0.0 - 1.2 mg/dL Final   • Alkaline Phosphatase 08/11/2020 102  39 - 117 IU/L Final   • AST (SGOT) 08/11/2020 17  0 - 40 IU/L Final   • ALT (SGPT) 08/11/2020 13  0 - 44 IU/L Final   • Total Cholesterol 08/11/2020 143  100 - 199 mg/dL Final   • Triglycerides 08/11/2020 156* 0 - 149 mg/dL Final   • HDL Cholesterol 08/11/2020 49  >39 mg/dL Final   • VLDL Cholesterol 08/11/2020 31  5 - 40 mg/dL Final   • LDL Cholesterol  08/11/2020 63  0 - 99 mg/dL Final   • LDL/HDL Ratio 08/11/2020 1.3  0.0 - 3.6 ratio Final    Comment:                                     LDL/HDL Ratio                                              Men  Women                                1/2 Avg.Risk  1.0    1.5                                    Avg.Risk  3.6    3.2                                 2X Avg.Risk  6.2    5.0                                 3X Avg.Risk  8.0    6.1     • Hemoglobin A1C 08/11/2020 7.3* 4.8 - 5.6 % Final    Comment:          Prediabetes: 5.7 - 6.4           Diabetes: >6.4           Glycemic control for adults with diabetes: <7.0     • PSA 08/11/2020 0.2  0.0 - 4.0 ng/mL Final    Comment: Roche ECLIA methodology.  According to the [employer] Association, Serum PSA should  decrease and remain at undetectable levels after radical  prostatectomy. The  AUA defines biochemical recurrence as an initial  PSA value 0.2 ng/mL or greater followed by a subsequent confirmatory  PSA value 0.2 ng/mL or greater.  Values obtained with different assay methods or kits cannot be used  interchangeably. Results cannot be interpreted as absolute evidence  of the presence or absence of malignant disease.           Review of Systems   Constitutional: Negative.    HENT: Negative.    Respiratory: Negative.    Cardiovascular: Negative.    Gastrointestinal: Negative.    Genitourinary: Negative.    Skin: Negative.    Neurological: Negative.    Psychiatric/Behavioral: Negative.        Objective   Physical Exam  Constitutional:       Appearance: Normal appearance.   HENT:      Head: Normocephalic.   Cardiovascular:      Rate and Rhythm: Normal rate and regular rhythm.      Pulses: Normal pulses.      Heart sounds: Normal heart sounds.   Pulmonary:      Effort: Pulmonary effort is normal.      Comments: Lungs are little diminished  Abdominal:      General: Bowel sounds are normal.   Musculoskeletal:         General: Normal range of motion.   Skin:     General: Skin is warm and dry.   Neurological:      Mental Status: He is alert and oriented to person, place, and time.   Psychiatric:         Mood and Affect: Mood normal.         Behavior: Behavior normal.         Procedures    Assessment/Plan   Diagnoses and all orders for this visit:    1. Mixed hyperlipidemia (Primary)  -     Lipid Panel With LDL / HDL Ratio    2. Diabetes mellitus type 2, insulin dependent (HCC)  -     Comprehensive Metabolic Panel  -     Hemoglobin A1c    3. Preventative health care  -     CBC & Differential    4. Screening PSA (prostate specific antigen)  -     PSA Screen    5. Cigarette nicotine dependence with nicotine-induced disorder    Other orders  -     albuterol sulfate  (90 Base) MCG/ACT inhaler; Inhale 2 puffs Every 4 (Four) Hours As Needed for Wheezing.  Dispense: 18.2 g; Refill: 2  -     atorvastatin  (LIPITOR) 20 MG tablet; Take 1 tablet by mouth every night at bedtime. No further refills until seen in office with fasting blood work  Dispense: 90 tablet; Refill: 1  -     glipizide (GLUCOTROL) 5 MG tablet; 1/2 tablet once a day  Dispense: 45 tablet; Refill: 1  -     glucose blood (Accu-Chek Ania Plus) test strip; USE TO CHECK BLOOD SUGAR THREE TIMES A DAY  Dispense: 100 each; Refill: 17  -     Insulin Pen Needle (B-D ULTRAFINE III SHORT PEN) 31G X 8 MM misc; Use as directed with Insulin  Dispense: 300 each; Refill: 2  -     Lancets Misc. (Accu-Chek FastClix Lancet) kit; Accu-Chek Fastclix Lancet Device Check BS 3 times a day  Dispense: 1 kit; Refill: 0  -     lisinopril (PRINIVIL,ZESTRIL) 10 MG tablet; Take 1 tablet by mouth Daily.  Dispense: 90 tablet; Refill: 1          Current Outpatient Medications:   •  albuterol sulfate  (90 Base) MCG/ACT inhaler, Inhale 2 puffs Every 4 (Four) Hours As Needed for Wheezing., Disp: 18.2 g, Rfl: 2  •  atorvastatin (LIPITOR) 20 MG tablet, Take 1 tablet by mouth every night at bedtime. No further refills until seen in office with fasting blood work, Disp: 90 tablet, Rfl: 1  •  glipizide (GLUCOTROL) 5 MG tablet, 1/2 tablet once a day, Disp: 45 tablet, Rfl: 1  •  glucose blood (Accu-Chek Ania Plus) test strip, USE TO CHECK BLOOD SUGAR THREE TIMES A DAY, Disp: 100 each, Rfl: 17  •  insulin aspart (NOVOLOG FLEXPEN) 100 UNIT/ML solution pen-injector sc pen, Sliding Scale, Disp: , Rfl:   •  Insulin Pen Needle (B-D ULTRAFINE III SHORT PEN) 31G X 8 MM misc, Use as directed with Insulin, Disp: 300 each, Rfl: 2  •  Lancets Misc. (Accu-Chek FastClix Lancet) kit, Accu-Chek Fastclix Lancet Device Check BS 3 times a day, Disp: 1 kit, Rfl: 0  •  lisinopril (PRINIVIL,ZESTRIL) 10 MG tablet, Take 1 tablet by mouth Daily., Disp: 90 tablet, Rfl: 1

## 2021-11-10 NOTE — PATIENT INSTRUCTIONS
Fasting blood work  Monitor blood sugars closely  Reconsider Covid vaccine and colonoscopy  Make 6-month follow-up appointment today

## 2021-11-11 DIAGNOSIS — N28.9 KIDNEY LESION, NATIVE, RIGHT: Primary | ICD-10-CM

## 2021-11-11 LAB
ALBUMIN SERPL-MCNC: 4.5 G/DL (ref 3.8–4.9)
ALBUMIN/GLOB SERPL: 2.1 {RATIO} (ref 1.2–2.2)
ALP SERPL-CCNC: 89 IU/L (ref 44–121)
ALT SERPL-CCNC: 14 IU/L (ref 0–44)
AST SERPL-CCNC: 15 IU/L (ref 0–40)
BASOPHILS # BLD AUTO: 0 X10E3/UL (ref 0–0.2)
BASOPHILS NFR BLD AUTO: 1 %
BILIRUB SERPL-MCNC: 0.3 MG/DL (ref 0–1.2)
BUN SERPL-MCNC: 8 MG/DL (ref 6–24)
BUN/CREAT SERPL: 12 (ref 9–20)
CALCIUM SERPL-MCNC: 9.4 MG/DL (ref 8.7–10.2)
CHLORIDE SERPL-SCNC: 102 MMOL/L (ref 96–106)
CHOLEST SERPL-MCNC: 159 MG/DL (ref 100–199)
CO2 SERPL-SCNC: 21 MMOL/L (ref 20–29)
CREAT SERPL-MCNC: 0.68 MG/DL (ref 0.76–1.27)
EOSINOPHIL # BLD AUTO: 0.1 X10E3/UL (ref 0–0.4)
EOSINOPHIL NFR BLD AUTO: 1 %
ERYTHROCYTE [DISTWIDTH] IN BLOOD BY AUTOMATED COUNT: 13.5 % (ref 11.6–15.4)
GLOBULIN SER CALC-MCNC: 2.1 G/DL (ref 1.5–4.5)
GLUCOSE SERPL-MCNC: 177 MG/DL (ref 65–99)
HBA1C MFR BLD: 7.8 % (ref 4.8–5.6)
HCT VFR BLD AUTO: 44.4 % (ref 37.5–51)
HDLC SERPL-MCNC: 48 MG/DL
HGB BLD-MCNC: 15.1 G/DL (ref 13–17.7)
IMM GRANULOCYTES # BLD AUTO: 0 X10E3/UL (ref 0–0.1)
IMM GRANULOCYTES NFR BLD AUTO: 0 %
LDLC SERPL CALC-MCNC: 93 MG/DL (ref 0–99)
LDLC/HDLC SERPL: 1.9 RATIO (ref 0–3.6)
LYMPHOCYTES # BLD AUTO: 1.4 X10E3/UL (ref 0.7–3.1)
LYMPHOCYTES NFR BLD AUTO: 21 %
MCH RBC QN AUTO: 29.2 PG (ref 26.6–33)
MCHC RBC AUTO-ENTMCNC: 34 G/DL (ref 31.5–35.7)
MCV RBC AUTO: 86 FL (ref 79–97)
MONOCYTES # BLD AUTO: 0.4 X10E3/UL (ref 0.1–0.9)
MONOCYTES NFR BLD AUTO: 7 %
NEUTROPHILS # BLD AUTO: 4.6 X10E3/UL (ref 1.4–7)
NEUTROPHILS NFR BLD AUTO: 70 %
PLATELET # BLD AUTO: 271 X10E3/UL (ref 150–450)
POTASSIUM SERPL-SCNC: 4.4 MMOL/L (ref 3.5–5.2)
PROT SERPL-MCNC: 6.6 G/DL (ref 6–8.5)
PSA SERPL-MCNC: 0.3 NG/ML (ref 0–4)
RBC # BLD AUTO: 5.17 X10E6/UL (ref 4.14–5.8)
SODIUM SERPL-SCNC: 139 MMOL/L (ref 134–144)
TRIGL SERPL-MCNC: 98 MG/DL (ref 0–149)
VLDLC SERPL CALC-MCNC: 18 MG/DL (ref 5–40)
WBC # BLD AUTO: 6.6 X10E3/UL (ref 3.4–10.8)

## 2022-04-04 RX ORDER — GLIPIZIDE 5 MG/1
TABLET ORAL
Qty: 45 TABLET | Refills: 1 | Status: SHIPPED | OUTPATIENT
Start: 2022-04-04 | End: 2022-08-01 | Stop reason: SDUPTHER

## 2022-05-12 RX ORDER — LISINOPRIL 10 MG/1
TABLET ORAL
Qty: 90 TABLET | Refills: 0 | Status: SHIPPED | OUTPATIENT
Start: 2022-05-12 | End: 2022-08-01 | Stop reason: SDUPTHER

## 2022-05-12 RX ORDER — ATORVASTATIN CALCIUM 20 MG/1
20 TABLET, FILM COATED ORAL
Qty: 90 TABLET | Refills: 0 | Status: SHIPPED | OUTPATIENT
Start: 2022-05-12 | End: 2022-08-01 | Stop reason: SDUPTHER

## 2022-08-01 ENCOUNTER — OFFICE VISIT (OUTPATIENT)
Dept: FAMILY MEDICINE CLINIC | Facility: CLINIC | Age: 62
End: 2022-08-01

## 2022-08-01 VITALS
OXYGEN SATURATION: 91 % | DIASTOLIC BLOOD PRESSURE: 76 MMHG | HEART RATE: 79 BPM | WEIGHT: 250 LBS | BODY MASS INDEX: 40.18 KG/M2 | SYSTOLIC BLOOD PRESSURE: 138 MMHG | HEIGHT: 66 IN | TEMPERATURE: 97.5 F

## 2022-08-01 DIAGNOSIS — M25.561 ACUTE PAIN OF RIGHT KNEE: Primary | ICD-10-CM

## 2022-08-01 DIAGNOSIS — E11.9 DIABETES MELLITUS TYPE 2, INSULIN DEPENDENT: ICD-10-CM

## 2022-08-01 DIAGNOSIS — Z79.4 DIABETES MELLITUS TYPE 2, INSULIN DEPENDENT: ICD-10-CM

## 2022-08-01 PROCEDURE — 99213 OFFICE O/P EST LOW 20 MIN: CPT | Performed by: NURSE PRACTITIONER

## 2022-08-01 RX ORDER — ATORVASTATIN CALCIUM 20 MG/1
20 TABLET, FILM COATED ORAL
Qty: 90 TABLET | Refills: 1 | Status: SHIPPED | OUTPATIENT
Start: 2022-08-01 | End: 2023-03-15 | Stop reason: SDUPTHER

## 2022-08-01 RX ORDER — ALBUTEROL SULFATE 90 UG/1
2 AEROSOL, METERED RESPIRATORY (INHALATION) EVERY 4 HOURS PRN
Qty: 18 G | Refills: 5 | Status: SHIPPED | OUTPATIENT
Start: 2022-08-01 | End: 2023-03-15 | Stop reason: SDUPTHER

## 2022-08-01 RX ORDER — LISINOPRIL 10 MG/1
10 TABLET ORAL DAILY
Qty: 90 TABLET | Refills: 1 | Status: SHIPPED | OUTPATIENT
Start: 2022-08-01 | End: 2023-03-15 | Stop reason: SDUPTHER

## 2022-08-01 RX ORDER — GLIPIZIDE 5 MG/1
2.5 TABLET ORAL DAILY
Qty: 45 TABLET | Refills: 1 | Status: SHIPPED | OUTPATIENT
Start: 2022-08-01 | End: 2023-02-01

## 2022-08-01 NOTE — PROGRESS NOTES
"    George Damon is a 62 y.o. male.     62-year-old obese white male who quit smoking recently with diabetes, COPD, CAD/hypertension, CHF, hyperlipidemia who comes in today for follow-up visit    Blood pressure 138/76 heart rate 78 he denies any chest pain, dyspnea, tachycardia or dizziness    Patient's main complaint is right knee pain after falling 2 weeks ago but he states the pain is not getting any better.  We will get x-rays today and go from there    Patient has not been checking blood sugars he has been out of supplies refilled all medications and supplies will be doing A1c today and patient will let me know what his blood sugars are doing    Patient's weight was down 4 pounds at last visit in November and he has lost an additional 18 pounds with a weight of 250 a BMI of 40.4 on this visit.    Patient has not been vaccinated for COVID his PSA is due in November he still refuses colonoscopies            Right knee x-ray  A1c today  Wear Ace wrap and use cane as needed  Call in blood sugars  Schedule eye exam       The following portions of the patient's history were reviewed and updated as appropriate: allergies, current medications, past family history, past medical history, past social history, past surgical history and problem list.    Vitals:    08/01/22 1541   BP: 138/76   BP Location: Right arm   Patient Position: Sitting   Cuff Size: Large Adult   Pulse: 79   Temp: 97.5 °F (36.4 °C)   TempSrc: Temporal   SpO2: 91%   Weight: 113 kg (250 lb)   Height: 167.6 cm (66\")     Body mass index is 40.35 kg/m².    Past Medical History:   Diagnosis Date   • Accidental fall    • Acute respiratory failure with hypercapnia (HCC)    • CAD (coronary artery disease)    • Congestive heart failure (HCC)    • COPD with acute exacerbation (HCC)    • Diabetes mellitus, type II (HCC)    • Diastolic dysfunction    • Enlarged heart    • Eye pain, left    • GERD (gastroesophageal reflux disease)     (gastric reflex)   • " Headache    • Hyperlipidemia    • Hypertension    • Knee pain, left    • Lumbar back pain     With radiculopathy   • Nicotine abuse     Dependence   • Obesity    • Onychauxis    • Pain in joint of right knee    • Pneumonia, bacterial    • Rib pain on right side    • Toe infection    • Wrist pain, left      Past Surgical History:   Procedure Laterality Date   • CARDIAC CATHETERIZATION  08/18/2015     Family History   Problem Relation Age of Onset   • Diabetes Other    • Cancer Other    • Heart disease Father        There is no immunization history on file for this patient.    Office Visit on 11/10/2021   Component Date Value Ref Range Status   • WBC 11/10/2021 6.6  3.4 - 10.8 x10E3/uL Final   • RBC 11/10/2021 5.17  4.14 - 5.80 x10E6/uL Final   • Hemoglobin 11/10/2021 15.1  13.0 - 17.7 g/dL Final   • Hematocrit 11/10/2021 44.4  37.5 - 51.0 % Final   • MCV 11/10/2021 86  79 - 97 fL Final   • MCH 11/10/2021 29.2  26.6 - 33.0 pg Final   • MCHC 11/10/2021 34.0  31.5 - 35.7 g/dL Final   • RDW 11/10/2021 13.5  11.6 - 15.4 % Final   • Platelets 11/10/2021 271  150 - 450 x10E3/uL Final   • Neutrophil Rel % 11/10/2021 70  Not Estab. % Final   • Lymphocyte Rel % 11/10/2021 21  Not Estab. % Final   • Monocyte Rel % 11/10/2021 7  Not Estab. % Final   • Eosinophil Rel % 11/10/2021 1  Not Estab. % Final   • Basophil Rel % 11/10/2021 1  Not Estab. % Final   • Neutrophils Absolute 11/10/2021 4.6  1.4 - 7.0 x10E3/uL Final   • Lymphocytes Absolute 11/10/2021 1.4  0.7 - 3.1 x10E3/uL Final   • Monocytes Absolute 11/10/2021 0.4  0.1 - 0.9 x10E3/uL Final   • Eosinophils Absolute 11/10/2021 0.1  0.0 - 0.4 x10E3/uL Final   • Basophils Absolute 11/10/2021 0.0  0.0 - 0.2 x10E3/uL Final   • Immature Granulocyte Rel % 11/10/2021 0  Not Estab. % Final   • Immature Grans Absolute 11/10/2021 0.0  0.0 - 0.1 x10E3/uL Final   • Glucose 11/10/2021 177 (A) 65 - 99 mg/dL Final   • BUN 11/10/2021 8  6 - 24 mg/dL Final   • Creatinine 11/10/2021 0.68 (A)  0.76 - 1.27 mg/dL Final   • eGFR Non  Am 11/10/2021 105  >59 mL/min/1.73 Final   • eGFR African Am 11/10/2021 121  >59 mL/min/1.73 Final    Comment: **In accordance with recommendations from the NKF-ASN Task force,**    [facility] is in the process of updating its eGFR calculation to the    2021 CKD-EPI creatinine equation that estimates kidney function    without a race variable.     • BUN/Creatinine Ratio 11/10/2021 12  9 - 20 Final   • Sodium 11/10/2021 139  134 - 144 mmol/L Final   • Potassium 11/10/2021 4.4  3.5 - 5.2 mmol/L Final   • Chloride 11/10/2021 102  96 - 106 mmol/L Final   • Total CO2 11/10/2021 21  20 - 29 mmol/L Final   • Calcium 11/10/2021 9.4  8.7 - 10.2 mg/dL Final   • Total Protein 11/10/2021 6.6  6.0 - 8.5 g/dL Final   • Albumin 11/10/2021 4.5  3.8 - 4.9 g/dL Final   • Globulin 11/10/2021 2.1  1.5 - 4.5 g/dL Final   • A/G Ratio 11/10/2021 2.1  1.2 - 2.2 Final   • Total Bilirubin 11/10/2021 0.3  0.0 - 1.2 mg/dL Final   • Alkaline Phosphatase 11/10/2021 89  44 - 121 IU/L Final                  **Please note reference interval change**   • AST (SGOT) 11/10/2021 15  0 - 40 IU/L Final   • ALT (SGPT) 11/10/2021 14  0 - 44 IU/L Final   • Total Cholesterol 11/10/2021 159  100 - 199 mg/dL Final   • Triglycerides 11/10/2021 98  0 - 149 mg/dL Final   • HDL Cholesterol 11/10/2021 48  >39 mg/dL Final   • VLDL Cholesterol Jono 11/10/2021 18  5 - 40 mg/dL Final   • LDL Chol Calc (NIH) 11/10/2021 93  0 - 99 mg/dL Final   • LDL/HDL RATIO 11/10/2021 1.9  0.0 - 3.6 ratio Final    Comment:                                     LDL/HDL Ratio                                              Men  Women                                1/2 Avg.Risk  1.0    1.5                                    Avg.Risk  3.6    3.2                                 2X Avg.Risk  6.2    5.0                                 3X Avg.Risk  8.0    6.1     • Hemoglobin A1C 11/10/2021 7.8 (A) 4.8 - 5.6 % Final    Comment:          Prediabetes: 5.7 -  6.4           Diabetes: >6.4           Glycemic control for adults with diabetes: <7.0     • PSA 11/10/2021 0.3  0.0 - 4.0 ng/mL Final    Comment: Roche ECLIA methodology.  According to the American U[employer] Association, Serum PSA should  decrease and remain at undetectable levels after radical  prostatectomy. The AUA defines biochemical recurrence as an initial  PSA value 0.2 ng/mL or greater followed by a subsequent confirmatory  PSA value 0.2 ng/mL or greater.  Values obtained with different assay methods or kits cannot be used  interchangeably. Results cannot be interpreted as absolute evidence  of the presence or absence of malignant disease.           Review of Systems   Constitutional: Negative.    HENT: Negative.    Respiratory: Negative.    Cardiovascular: Negative.    Gastrointestinal: Negative.    Genitourinary: Negative.    Musculoskeletal:        Right knee pain   Skin: Negative.    Neurological: Negative.        Objective   Physical Exam  Constitutional:       Appearance: Normal appearance.   HENT:      Head: Normocephalic.   Cardiovascular:      Rate and Rhythm: Normal rate and regular rhythm.      Pulses: Normal pulses.      Heart sounds: Normal heart sounds.   Pulmonary:      Effort: Pulmonary effort is normal.      Breath sounds: Normal breath sounds.   Abdominal:      General: Bowel sounds are normal.   Musculoskeletal:      Comments: Right knee slightly swollen pain with weightbearing   Skin:     General: Skin is warm and dry.   Neurological:      General: No focal deficit present.      Mental Status: He is alert and oriented to person, place, and time.   Psychiatric:         Mood and Affect: Mood normal.         Behavior: Behavior normal.         Procedures    Assessment & Plan   Diagnoses and all orders for this visit:    1. Acute pain of right knee (Primary)  -     XR Knee 1 or 2 View Right    2. Diabetes mellitus type 2, insulin dependent (HCC)  -     Hemoglobin A1c    Other orders  -      albuterol sulfate  (90 Base) MCG/ACT inhaler; Inhale 2 puffs Every 4 (Four) Hours As Needed for Wheezing.  Dispense: 18 g; Refill: 5  -     atorvastatin (LIPITOR) 20 MG tablet; Take 1 tablet by mouth every night at bedtime.  Dispense: 90 tablet; Refill: 1  -     lisinopril (PRINIVIL,ZESTRIL) 10 MG tablet; Take 1 tablet by mouth Daily.  Dispense: 90 tablet; Refill: 1  -     glipizide (GLUCOTROL) 5 MG tablet; Take 0.5 tablets by mouth Daily.  Dispense: 45 tablet; Refill: 1  -     glucose blood (Accu-Chek Ania Plus) test strip; 1 each by Other route 3 (Three) Times a Day. (Pt is on sliding scale and needs to check TID)  Dispense: 300 each; Refill: 5  -     insulin aspart (novoLOG FLEXPEN) 100 UNIT/ML solution pen-injector sc pen; Inject 2-4 Units under the skin into the appropriate area as directed 3 (Three) Times a Day With Meals. Sliding Scale  Lower than 250 = 2 units  Greater than 250 = 4 units  Dispense: 5 pen; Refill: 5          Current Outpatient Medications:   •  albuterol sulfate  (90 Base) MCG/ACT inhaler, Inhale 2 puffs Every 4 (Four) Hours As Needed for Wheezing., Disp: 18 g, Rfl: 5  •  atorvastatin (LIPITOR) 20 MG tablet, Take 1 tablet by mouth every night at bedtime., Disp: 90 tablet, Rfl: 1  •  glipizide (GLUCOTROL) 5 MG tablet, Take 0.5 tablets by mouth Daily., Disp: 45 tablet, Rfl: 1  •  glucose blood (Accu-Chek Ania Plus) test strip, 1 each by Other route 3 (Three) Times a Day. (Pt is on sliding scale and needs to check TID), Disp: 300 each, Rfl: 5  •  insulin aspart (novoLOG FLEXPEN) 100 UNIT/ML solution pen-injector sc pen, Inject 2-4 Units under the skin into the appropriate area as directed 3 (Three) Times a Day With Meals. Sliding Scale Lower than 250 = 2 units Greater than 250 = 4 units, Disp: 5 pen, Rfl: 5  •  Insulin Pen Needle (B-D ULTRAFINE III SHORT PEN) 31G X 8 MM misc, Use as directed with Insulin, Disp: 300 each, Rfl: 2  •  Lancets Misc. (Accu-Chek FastClix Lancet) kit,  Accu-Chek Fastclix Lancet Device Check BS 3 times a day, Disp: 1 kit, Rfl: 0  •  lisinopril (PRINIVIL,ZESTRIL) 10 MG tablet, Take 1 tablet by mouth Daily., Disp: 90 tablet, Rfl: 1           Radha Ryder, APRN 8/1/2022 16:52 EDT  This note has been electronically signed

## 2022-08-01 NOTE — PATIENT INSTRUCTIONS
Right knee x-ray  A1c today  Wear Ace wrap and use cane as needed  Call in blood sugars  Schedule eye exam

## 2022-08-02 LAB — HBA1C MFR BLD: 8.4 % (ref 4.8–5.6)

## 2022-08-09 RX ORDER — BLOOD-GLUCOSE METER
1 EACH MISCELLANEOUS ONCE
Qty: 1 KIT | Refills: 0 | Status: SHIPPED | OUTPATIENT
Start: 2022-08-09 | End: 2022-08-09

## 2022-08-09 RX ORDER — LANCETS
EACH MISCELLANEOUS
Qty: 100 EACH | Refills: 12 | Status: SHIPPED | OUTPATIENT
Start: 2022-08-09 | End: 2022-08-09

## 2022-08-09 RX ORDER — LANCETS
EACH MISCELLANEOUS
Qty: 100 EACH | Refills: 12 | Status: SHIPPED | OUTPATIENT
Start: 2022-08-09 | End: 2023-03-15

## 2022-10-26 ENCOUNTER — OFFICE VISIT (OUTPATIENT)
Dept: FAMILY MEDICINE CLINIC | Facility: CLINIC | Age: 62
End: 2022-10-26

## 2022-10-26 VITALS
SYSTOLIC BLOOD PRESSURE: 114 MMHG | HEIGHT: 66 IN | DIASTOLIC BLOOD PRESSURE: 77 MMHG | BODY MASS INDEX: 42.27 KG/M2 | TEMPERATURE: 97.6 F | OXYGEN SATURATION: 90 % | WEIGHT: 263 LBS | HEART RATE: 86 BPM

## 2022-10-26 DIAGNOSIS — S69.92XA INJURY OF LEFT WRIST, INITIAL ENCOUNTER: Primary | ICD-10-CM

## 2022-10-26 PROCEDURE — 99213 OFFICE O/P EST LOW 20 MIN: CPT | Performed by: NURSE PRACTITIONER

## 2022-10-26 RX ORDER — IBUPROFEN 800 MG/1
800 TABLET ORAL EVERY 8 HOURS PRN
Qty: 60 TABLET | Refills: 3 | Status: SHIPPED | OUTPATIENT
Start: 2022-10-26 | End: 2023-03-15 | Stop reason: SDUPTHER

## 2022-10-26 NOTE — PROGRESS NOTES
"    George Damon is a 62 y.o. male.     History of Present Illness  62-year-old obese white male who recently quit smoking with history of diabetes, COPD, CAD/hypertension, CHF, hyperlipidemia he states he fell last week catching himself with his left hand.  He states since then he has had severe pain below thumb and index finger top of left hand.  He states initially it was yellow but now it appears to be almost normal colored.  He has been wearing a brace without a thumb spica however he states the pain has not improved.  We will get an x-ray and refer him to Ortho    Vital signs are stable              Left wrist x-ray  Orthopedic referral  Ibuprofen 800 3 times daily with food  Thumb spica brace       The following portions of the patient's history were reviewed and updated as appropriate: allergies, current medications, past family history, past medical history, past social history, past surgical history and problem list.    Vitals:    10/26/22 1345   BP: 114/77   BP Location: Right arm   Patient Position: Sitting   Cuff Size: Large Adult   Pulse: 86   Temp: 97.6 °F (36.4 °C)   TempSrc: Temporal   SpO2: 90%   Weight: 119 kg (263 lb)   Height: 167.6 cm (66\")     Body mass index is 42.45 kg/m².    Past Medical History:   Diagnosis Date   • Accidental fall    • Acute respiratory failure with hypercapnia (HCC)    • CAD (coronary artery disease)    • Congestive heart failure (HCC)    • COPD with acute exacerbation (HCC)    • Diabetes mellitus, type II (HCC)    • Diastolic dysfunction    • Enlarged heart    • Eye pain, left    • GERD (gastroesophageal reflux disease)     (gastric reflex)   • Headache    • Hyperlipidemia    • Hypertension    • Knee pain, left    • Lumbar back pain     With radiculopathy   • Nicotine abuse     Dependence   • Obesity    • Onychauxis    • Pain in joint of right knee    • Pneumonia, bacterial    • Rib pain on right side    • Toe infection    • Wrist pain, left      Past Surgical " History:   Procedure Laterality Date   • CARDIAC CATHETERIZATION  08/18/2015     Family History   Problem Relation Age of Onset   • Diabetes Other    • Cancer Other    • Heart disease Father        There is no immunization history on file for this patient.    Office Visit on 08/01/2022   Component Date Value Ref Range Status   • Hemoglobin A1C 08/01/2022 8.4 (H)  4.8 - 5.6 % Final    Comment:          Prediabetes: 5.7 - 6.4           Diabetes: >6.4           Glycemic control for adults with diabetes: <7.0           Review of Systems   Constitutional: Negative.    HENT: Negative.    Respiratory: Negative.    Cardiovascular: Negative.    Gastrointestinal: Negative.    Genitourinary: Negative.    Musculoskeletal:        Left hand pain   Skin: Negative.    Neurological: Negative.    Psychiatric/Behavioral: Negative.        Objective   Physical Exam  Constitutional:       Appearance: Normal appearance.   HENT:      Head: Normocephalic.   Cardiovascular:      Rate and Rhythm: Normal rate and regular rhythm.      Pulses: Normal pulses.      Heart sounds: Normal heart sounds.   Pulmonary:      Effort: Pulmonary effort is normal.      Breath sounds: Normal breath sounds.   Abdominal:      General: Bowel sounds are normal.   Musculoskeletal:      Comments: Some swelling noted below thumb top of left hand below index finger   Skin:     General: Skin is warm and dry.   Neurological:      General: No focal deficit present.      Mental Status: He is alert and oriented to person, place, and time.   Psychiatric:         Mood and Affect: Mood normal.         Behavior: Behavior normal.         Procedures    Assessment & Plan   Diagnoses and all orders for this visit:    1. Injury of left wrist, initial encounter (Primary)  -     XR Wrist 3+ View Left (In Office)    Other orders  -     ibuprofen (ADVIL,MOTRIN) 800 MG tablet; Take 1 tablet by mouth Every 8 (Eight) Hours As Needed for Mild Pain.  Dispense: 60 tablet; Refill:  3          Current Outpatient Medications:   •  Accu-Chek Softclix Lancets lancets, Check BS 3 times daily DX CODE E11.9 (Pt is on sliding scale and needs to check TID), Disp: 100 each, Rfl: 12  •  albuterol sulfate  (90 Base) MCG/ACT inhaler, Inhale 2 puffs Every 4 (Four) Hours As Needed for Wheezing., Disp: 18 g, Rfl: 5  •  atorvastatin (LIPITOR) 20 MG tablet, Take 1 tablet by mouth every night at bedtime., Disp: 90 tablet, Rfl: 1  •  glipizide (GLUCOTROL) 5 MG tablet, Take 0.5 tablets by mouth Daily., Disp: 45 tablet, Rfl: 1  •  glucose blood (Accu-Chek Guide) test strip, Accu-Chek Guide Test Strips Check BS 3 times a day DX CODE E11.9  (Pt is on sliding scale and needs to check TID), Disp: 300 each, Rfl: 5  •  insulin aspart (novoLOG FLEXPEN) 100 UNIT/ML solution pen-injector sc pen, Inject 2-4 Units under the skin into the appropriate area as directed 3 (Three) Times a Day With Meals. Sliding Scale Lower than 250 = 2 units Greater than 250 = 4 units, Disp: 5 pen, Rfl: 5  •  Insulin Pen Needle (B-D ULTRAFINE III SHORT PEN) 31G X 8 MM misc, Use as directed with Insulin, Disp: 300 each, Rfl: 2  •  lisinopril (PRINIVIL,ZESTRIL) 10 MG tablet, Take 1 tablet by mouth Daily., Disp: 90 tablet, Rfl: 1  •  ibuprofen (ADVIL,MOTRIN) 800 MG tablet, Take 1 tablet by mouth Every 8 (Eight) Hours As Needed for Mild Pain., Disp: 60 tablet, Rfl: 3           Radha Ryder, GIFTY 10/26/2022 16:12 EDT  This note has been electronically signed

## 2023-02-01 DIAGNOSIS — E11.65 TYPE 2 DIABETES MELLITUS WITH HYPERGLYCEMIA, WITH LONG-TERM CURRENT USE OF INSULIN: ICD-10-CM

## 2023-02-01 DIAGNOSIS — Z00.00 PREVENTATIVE HEALTH CARE: ICD-10-CM

## 2023-02-01 DIAGNOSIS — E78.2 MIXED HYPERLIPIDEMIA: Primary | ICD-10-CM

## 2023-02-01 DIAGNOSIS — Z13.220 LIPID SCREENING: ICD-10-CM

## 2023-02-01 DIAGNOSIS — Z12.5 SCREENING PSA (PROSTATE SPECIFIC ANTIGEN): ICD-10-CM

## 2023-02-01 DIAGNOSIS — Z79.4 TYPE 2 DIABETES MELLITUS WITH HYPERGLYCEMIA, WITH LONG-TERM CURRENT USE OF INSULIN: ICD-10-CM

## 2023-02-01 RX ORDER — GLIPIZIDE 5 MG/1
TABLET ORAL
Qty: 45 TABLET | Refills: 1 | Status: SHIPPED | OUTPATIENT
Start: 2023-02-01 | End: 2023-03-15 | Stop reason: SDUPTHER

## 2023-02-03 LAB
ALBUMIN SERPL-MCNC: 4.5 G/DL (ref 3.8–4.9)
ALBUMIN/GLOB SERPL: 2.3 {RATIO} (ref 1.2–2.2)
ALP SERPL-CCNC: 87 IU/L (ref 44–121)
ALT SERPL-CCNC: 14 IU/L (ref 0–44)
AST SERPL-CCNC: 19 IU/L (ref 0–40)
BASOPHILS # BLD AUTO: 0 X10E3/UL (ref 0–0.2)
BASOPHILS NFR BLD AUTO: 1 %
BILIRUB SERPL-MCNC: 0.3 MG/DL (ref 0–1.2)
BUN SERPL-MCNC: 8 MG/DL (ref 8–27)
BUN/CREAT SERPL: 12 (ref 10–24)
CALCIUM SERPL-MCNC: 9.2 MG/DL (ref 8.6–10.2)
CHLORIDE SERPL-SCNC: 101 MMOL/L (ref 96–106)
CHOLEST SERPL-MCNC: 148 MG/DL (ref 100–199)
CO2 SERPL-SCNC: 24 MMOL/L (ref 20–29)
CREAT SERPL-MCNC: 0.69 MG/DL (ref 0.76–1.27)
EGFRCR SERPLBLD CKD-EPI 2021: 106 ML/MIN/1.73
EOSINOPHIL # BLD AUTO: 0.1 X10E3/UL (ref 0–0.4)
EOSINOPHIL NFR BLD AUTO: 2 %
ERYTHROCYTE [DISTWIDTH] IN BLOOD BY AUTOMATED COUNT: 13.6 % (ref 11.6–15.4)
GLOBULIN SER CALC-MCNC: 2 G/DL (ref 1.5–4.5)
GLUCOSE SERPL-MCNC: 176 MG/DL (ref 70–99)
HBA1C MFR BLD: 8.6 % (ref 4.8–5.6)
HCT VFR BLD AUTO: 45 % (ref 37.5–51)
HDLC SERPL-MCNC: 54 MG/DL
HGB BLD-MCNC: 14.7 G/DL (ref 13–17.7)
IMM GRANULOCYTES # BLD AUTO: 0 X10E3/UL (ref 0–0.1)
IMM GRANULOCYTES NFR BLD AUTO: 0 %
LDLC SERPL CALC-MCNC: 79 MG/DL (ref 0–99)
LDLC/HDLC SERPL: 1.5 RATIO (ref 0–3.6)
LYMPHOCYTES # BLD AUTO: 1.6 X10E3/UL (ref 0.7–3.1)
LYMPHOCYTES NFR BLD AUTO: 26 %
MCH RBC QN AUTO: 28.5 PG (ref 26.6–33)
MCHC RBC AUTO-ENTMCNC: 32.7 G/DL (ref 31.5–35.7)
MCV RBC AUTO: 87 FL (ref 79–97)
MONOCYTES # BLD AUTO: 0.5 X10E3/UL (ref 0.1–0.9)
MONOCYTES NFR BLD AUTO: 7 %
NEUTROPHILS # BLD AUTO: 3.9 X10E3/UL (ref 1.4–7)
NEUTROPHILS NFR BLD AUTO: 64 %
PLATELET # BLD AUTO: 278 X10E3/UL (ref 150–450)
POTASSIUM SERPL-SCNC: 4.1 MMOL/L (ref 3.5–5.2)
PROT SERPL-MCNC: 6.5 G/DL (ref 6–8.5)
PSA SERPL-MCNC: 0.3 NG/ML (ref 0–4)
RBC # BLD AUTO: 5.16 X10E6/UL (ref 4.14–5.8)
SODIUM SERPL-SCNC: 141 MMOL/L (ref 134–144)
TRIGL SERPL-MCNC: 77 MG/DL (ref 0–149)
VLDLC SERPL CALC-MCNC: 15 MG/DL (ref 5–40)
WBC # BLD AUTO: 6.1 X10E3/UL (ref 3.4–10.8)

## 2023-02-06 ENCOUNTER — TELEPHONE (OUTPATIENT)
Dept: FAMILY MEDICINE CLINIC | Facility: CLINIC | Age: 63
End: 2023-02-06
Payer: MEDICAID

## 2023-02-06 NOTE — TELEPHONE ENCOUNTER
----- Message from GIFTY Khan sent at 2/5/2023 10:58 AM EST -----  A1c alittle worse, otherwise labs stable

## 2023-03-15 ENCOUNTER — OFFICE VISIT (OUTPATIENT)
Dept: FAMILY MEDICINE CLINIC | Facility: CLINIC | Age: 63
End: 2023-03-15
Payer: MEDICAID

## 2023-03-15 VITALS
DIASTOLIC BLOOD PRESSURE: 76 MMHG | OXYGEN SATURATION: 87 % | HEIGHT: 66 IN | WEIGHT: 262 LBS | BODY MASS INDEX: 42.11 KG/M2 | TEMPERATURE: 97.6 F | SYSTOLIC BLOOD PRESSURE: 113 MMHG | HEART RATE: 84 BPM

## 2023-03-15 DIAGNOSIS — E11.65 TYPE 2 DIABETES MELLITUS WITH HYPERGLYCEMIA, WITH LONG-TERM CURRENT USE OF INSULIN: Primary | ICD-10-CM

## 2023-03-15 DIAGNOSIS — G25.2 INTENTION TREMOR: ICD-10-CM

## 2023-03-15 DIAGNOSIS — J44.1 ACUTE EXACERBATION OF CHRONIC OBSTRUCTIVE PULMONARY DISEASE: ICD-10-CM

## 2023-03-15 DIAGNOSIS — Z79.4 TYPE 2 DIABETES MELLITUS WITH HYPERGLYCEMIA, WITH LONG-TERM CURRENT USE OF INSULIN: Primary | ICD-10-CM

## 2023-03-15 LAB — GLUCOSE BLDC GLUCOMTR-MCNC: 102 MG/DL (ref 70–130)

## 2023-03-15 PROCEDURE — 3078F DIAST BP <80 MM HG: CPT | Performed by: NURSE PRACTITIONER

## 2023-03-15 PROCEDURE — 3074F SYST BP LT 130 MM HG: CPT | Performed by: NURSE PRACTITIONER

## 2023-03-15 PROCEDURE — T1015 CLINIC SERVICE: HCPCS | Performed by: NURSE PRACTITIONER

## 2023-03-15 PROCEDURE — 96372 THER/PROPH/DIAG INJ SC/IM: CPT | Performed by: NURSE PRACTITIONER

## 2023-03-15 PROCEDURE — 3052F HG A1C>EQUAL 8.0%<EQUAL 9.0%: CPT | Performed by: NURSE PRACTITIONER

## 2023-03-15 PROCEDURE — 99214 OFFICE O/P EST MOD 30 MIN: CPT | Performed by: NURSE PRACTITIONER

## 2023-03-15 PROCEDURE — 82962 GLUCOSE BLOOD TEST: CPT | Performed by: NURSE PRACTITIONER

## 2023-03-15 RX ORDER — ATORVASTATIN CALCIUM 20 MG/1
20 TABLET, FILM COATED ORAL
Qty: 90 TABLET | Refills: 1 | Status: SHIPPED | OUTPATIENT
Start: 2023-03-15

## 2023-03-15 RX ORDER — BLOOD-GLUCOSE METER
1 EACH MISCELLANEOUS TAKE AS DIRECTED
Qty: 1 KIT | Refills: 0 | Status: SHIPPED | OUTPATIENT
Start: 2023-03-15

## 2023-03-15 RX ORDER — IBUPROFEN 800 MG/1
800 TABLET ORAL EVERY 8 HOURS PRN
Qty: 60 TABLET | Refills: 3 | Status: SHIPPED | OUTPATIENT
Start: 2023-03-15

## 2023-03-15 RX ORDER — CEFTRIAXONE 1 G/1
1 INJECTION, POWDER, FOR SOLUTION INTRAMUSCULAR; INTRAVENOUS ONCE
Status: COMPLETED | OUTPATIENT
Start: 2023-03-15 | End: 2023-03-15

## 2023-03-15 RX ORDER — BUTALBITAL, ACETAMINOPHEN AND CAFFEINE 50; 325; 40 MG/1; MG/1; MG/1
1 TABLET ORAL EVERY 4 HOURS PRN
Qty: 30 TABLET | Refills: 2 | Status: SHIPPED | OUTPATIENT
Start: 2023-03-15

## 2023-03-15 RX ORDER — BUDESONIDE AND FORMOTEROL FUMARATE DIHYDRATE 80; 4.5 UG/1; UG/1
2 AEROSOL RESPIRATORY (INHALATION)
Qty: 10.2 G | Refills: 12 | Status: SHIPPED | OUTPATIENT
Start: 2023-03-15

## 2023-03-15 RX ORDER — DOXYCYCLINE 100 MG/1
100 CAPSULE ORAL 2 TIMES DAILY
Qty: 20 CAPSULE | Refills: 0 | Status: SHIPPED | OUTPATIENT
Start: 2023-03-15

## 2023-03-15 RX ORDER — LISINOPRIL 10 MG/1
10 TABLET ORAL DAILY
Qty: 90 TABLET | Refills: 1 | Status: SHIPPED | OUTPATIENT
Start: 2023-03-15 | End: 2023-04-01

## 2023-03-15 RX ORDER — PREDNISONE 5 MG/1
TABLET ORAL
Qty: 20 TABLET | Refills: 0 | Status: SHIPPED | OUTPATIENT
Start: 2023-03-15 | End: 2023-03-23

## 2023-03-15 RX ORDER — LANCETS
EACH MISCELLANEOUS
Qty: 100 EACH | Refills: 12 | Status: SHIPPED | OUTPATIENT
Start: 2023-03-15

## 2023-03-15 RX ORDER — GLIPIZIDE 5 MG/1
2.5 TABLET ORAL DAILY
Qty: 45 TABLET | Refills: 1 | Status: SHIPPED | OUTPATIENT
Start: 2023-03-15

## 2023-03-15 RX ORDER — ALBUTEROL SULFATE 90 UG/1
2 AEROSOL, METERED RESPIRATORY (INHALATION) EVERY 4 HOURS PRN
Qty: 18 G | Refills: 5 | Status: SHIPPED | OUTPATIENT
Start: 2023-03-15

## 2023-03-15 RX ADMIN — CEFTRIAXONE 1 G: 1 INJECTION, POWDER, FOR SOLUTION INTRAMUSCULAR; INTRAVENOUS at 16:19

## 2023-03-15 NOTE — PROGRESS NOTES
George Damon is a 62 y.o. male.     History of Present Illness  62-year-old obese white male recently quit smoking with history diabetes, COPD, CAD/hypertension, CHF, hyperlipidemia who comes in today for follow-up visit    Blood pressure 112/76 heart rate 84 he denies any chest pain, dyspnea, tachycardia or dizziness    Patient has not been checking blood sugars because he cannot afford to buy the Accu-Chek strips.  Blood sugar today was 102 by fingerstick.  Last blood work was sugar 176 A1c 8.6.    Patient states last hospitalization they was told he had Parkinson's and he does have bilateral hand tremors makes it difficult for him to write along with dizziness headaches I am going to place him on some Sinemet to see if that helps with any of his symptoms.  He does not have the gas right now to drive to [place] to see neurology.    Patient also complaining of dyspnea he wears oxygen at night at home however does not have a pulse oximeter to check his oxygen levels.  His O2 today is 87% on room air and patient has rhonchi and wheezing throughout.  He quit smoking recently.  We will give him a gram of Rocephin oral antibiotics steroids and I ordered a nebulizer to see if it would be covered by insurance.  We will also place him on steroid inhaler along with his albuterol inhaler    He also complains of thunder Headaches that started happening a couple months ago I called him in some Fioricet to use along with some ibuprofen when he gets a headache.  If symptoms persist we will try to get CT of the head and find some way to get him to neurology          Sinemet 10/100 mg 3 times daily  1 g Rocephin  Doxycycline 100 mg twice daily x10 days  Prednisone 5 mg taper dose monitor blood sugars  Symbicort 80/4.52 puffs twice a day  Call office if nebulizers covered I will call in the medicine for it.  Wear mask when going outside         The following portions of the patient's history were reviewed and updated as  "appropriate: allergies, current medications, past family history, past medical history, past social history, past surgical history and problem list.    Vitals:    03/15/23 1525   BP: 113/76   BP Location: Right arm   Patient Position: Sitting   Cuff Size: Large Adult   Pulse: 84   Temp: 97.6 °F (36.4 °C)   TempSrc: Temporal   SpO2: (!) 87%   Weight: 119 kg (262 lb)   Height: 167.6 cm (66\")     Body mass index is 42.29 kg/m².    Past Medical History:   Diagnosis Date   • Accidental fall    • Acute respiratory failure with hypercapnia (HCC)    • CAD (coronary artery disease)    • Congestive heart failure (HCC)    • COPD with acute exacerbation (HCC)    • Diabetes mellitus, type II (HCC)    • Diastolic dysfunction    • Enlarged heart    • Eye pain, left    • GERD (gastroesophageal reflux disease)     (gastric reflex)   • Headache    • Hyperlipidemia    • Hypertension    • Knee pain, left    • Lumbar back pain     With radiculopathy   • Nicotine abuse     Dependence   • Obesity    • Onychauxis    • Pain in joint of right knee    • Pneumonia, bacterial    • Rib pain on right side    • Toe infection    • Wrist pain, left      Past Surgical History:   Procedure Laterality Date   • CARDIAC CATHETERIZATION  08/18/2015     Family History   Problem Relation Age of Onset   • Diabetes Other    • Cancer Other    • Heart disease Father        There is no immunization history on file for this patient.    Orders Only on 02/01/2023   Component Date Value Ref Range Status   • PSA 02/02/2023 0.3  0.0 - 4.0 ng/mL Final    Comment: Roche ECLIA methodology.  According to the American U[employer] Association, Serum PSA should  decrease and remain at undetectable levels after radical  prostatectomy. The AUA defines biochemical recurrence as an initial  PSA value 0.2 ng/mL or greater followed by a subsequent confirmatory  PSA value 0.2 ng/mL or greater.  Values obtained with different assay methods or kits cannot be used  interchangeably. " Results cannot be interpreted as absolute evidence  of the presence or absence of malignant disease.     • Total Cholesterol 02/02/2023 148  100 - 199 mg/dL Final   • Triglycerides 02/02/2023 77  0 - 149 mg/dL Final   • HDL Cholesterol 02/02/2023 54  >39 mg/dL Final   • VLDL Cholesterol Jono 02/02/2023 15  5 - 40 mg/dL Final   • LDL Chol Calc (NIH) 02/02/2023 79  0 - 99 mg/dL Final   • LDL/HDL RATIO 02/02/2023 1.5  0.0 - 3.6 ratio Final    Comment:                                     LDL/HDL Ratio                                              Men  Women                                1/2 Avg.Risk  1.0    1.5                                    Avg.Risk  3.6    3.2                                 2X Avg.Risk  6.2    5.0                                 3X Avg.Risk  8.0    6.1     • Hemoglobin A1C 02/02/2023 8.6 (H)  4.8 - 5.6 % Final    Comment:          Prediabetes: 5.7 - 6.4           Diabetes: >6.4           Glycemic control for adults with diabetes: <7.0     • Glucose 02/02/2023 176 (H)  70 - 99 mg/dL Final   • BUN 02/02/2023 8  8 - 27 mg/dL Final   • Creatinine 02/02/2023 0.69 (L)  0.76 - 1.27 mg/dL Final   • EGFR Result 02/02/2023 106  >59 mL/min/1.73 Final   • BUN/Creatinine Ratio 02/02/2023 12  10 - 24 Final   • Sodium 02/02/2023 141  134 - 144 mmol/L Final   • Potassium 02/02/2023 4.1  3.5 - 5.2 mmol/L Final   • Chloride 02/02/2023 101  96 - 106 mmol/L Final   • Total CO2 02/02/2023 24  20 - 29 mmol/L Final   • Calcium 02/02/2023 9.2  8.6 - 10.2 mg/dL Final   • Total Protein 02/02/2023 6.5  6.0 - 8.5 g/dL Final   • Albumin 02/02/2023 4.5  3.8 - 4.9 g/dL Final   • Globulin 02/02/2023 2.0  1.5 - 4.5 g/dL Final   • A/G Ratio 02/02/2023 2.3 (H)  1.2 - 2.2 Final   • Total Bilirubin 02/02/2023 0.3  0.0 - 1.2 mg/dL Final   • Alkaline Phosphatase 02/02/2023 87  44 - 121 IU/L Final   • AST (SGOT) 02/02/2023 19  0 - 40 IU/L Final   • ALT (SGPT) 02/02/2023 14  0 - 44 IU/L Final   • WBC 02/02/2023 6.1  3.4 - 10.8 x10E3/uL  Final   • RBC 02/02/2023 5.16  4.14 - 5.80 x10E6/uL Final   • Hemoglobin 02/02/2023 14.7  13.0 - 17.7 g/dL Final   • Hematocrit 02/02/2023 45.0  37.5 - 51.0 % Final   • MCV 02/02/2023 87  79 - 97 fL Final   • MCH 02/02/2023 28.5  26.6 - 33.0 pg Final   • MCHC 02/02/2023 32.7  31.5 - 35.7 g/dL Final   • RDW 02/02/2023 13.6  11.6 - 15.4 % Final   • Platelets 02/02/2023 278  150 - 450 x10E3/uL Final   • Neutrophil Rel % 02/02/2023 64  Not Estab. % Final   • Lymphocyte Rel % 02/02/2023 26  Not Estab. % Final   • Monocyte Rel % 02/02/2023 7  Not Estab. % Final   • Eosinophil Rel % 02/02/2023 2  Not Estab. % Final   • Basophil Rel % 02/02/2023 1  Not Estab. % Final   • Neutrophils Absolute 02/02/2023 3.9  1.4 - 7.0 x10E3/uL Final   • Lymphocytes Absolute 02/02/2023 1.6  0.7 - 3.1 x10E3/uL Final   • Monocytes Absolute 02/02/2023 0.5  0.1 - 0.9 x10E3/uL Final   • Eosinophils Absolute 02/02/2023 0.1  0.0 - 0.4 x10E3/uL Final   • Basophils Absolute 02/02/2023 0.0  0.0 - 0.2 x10E3/uL Final   • Immature Granulocyte Rel % 02/02/2023 0  Not Estab. % Final   • Immature Grans Absolute 02/02/2023 0.0  0.0 - 0.1 x10E3/uL Final         Review of Systems   Constitutional: Negative.    HENT: Negative.    Respiratory: Positive for cough and shortness of breath.    Cardiovascular: Negative.    Gastrointestinal: Negative.    Genitourinary: Negative.    Musculoskeletal: Negative.    Skin: Negative.    Neurological: Positive for dizziness, tremors and headache.   Psychiatric/Behavioral: Negative.        Objective   Physical Exam  Constitutional:       Appearance: Normal appearance.   HENT:      Head: Normocephalic.   Cardiovascular:      Rate and Rhythm: Normal rate and regular rhythm.      Pulses: Normal pulses.   Pulmonary:      Effort: Pulmonary effort is normal.      Comments: Rhonchi wheezes throughout  Abdominal:      General: Bowel sounds are normal.   Musculoskeletal:         General: Normal range of motion.   Skin:     General: Skin  is warm and dry.   Neurological:      General: No focal deficit present.      Mental Status: He is alert.      Comments: Bilateral upper extremity intention tremors   Psychiatric:         Mood and Affect: Mood normal.         Behavior: Behavior normal.         Procedures    Assessment & Plan   Diagnoses and all orders for this visit:    1. Type 2 diabetes mellitus with hyperglycemia, with long-term current use of insulin ([facility]) (Primary)  -     POCT Glucose    2. Acute exacerbation of chronic obstructive pulmonary disease ([facility])    3. Intention tremor    Other orders  -     Blood Glucose Monitoring Suppl (Accu-Chek Guide Me) w/Device kit; 1 Device Take As Directed. Accu-Chek Guide Me Kit Check BS 3 times a day DX Code E11.9.  Pt is Insulin Dependent  Dispense: 1 kit; Refill: 0  -     glucose blood (Accu-Chek Guide) test strip; Accu-Chek Guide Test Strips Check BS 3 times a day DX Code E11.9  Pt is Insulin Dependent  Dispense: 300 each; Refill: 5  -     Accu-Chek Softclix Lancets lancets; Check BS 3 times daily DX CODE E11.9 Pt is Insulin Dependent  Dispense: 100 each; Refill: 12  -     lisinopril (PRINIVIL,ZESTRIL) 10 MG tablet; Take 1 tablet by mouth Daily.  Dispense: 90 tablet; Refill: 1  -     albuterol sulfate  (90 Base) MCG/ACT inhaler; Inhale 2 puffs Every 4 (Four) Hours As Needed for Wheezing.  Dispense: 18 g; Refill: 5  -     atorvastatin (LIPITOR) 20 MG tablet; Take 1 tablet by mouth every night at bedtime.  Dispense: 90 tablet; Refill: 1  -     glipizide (GLUCOTROL) 5 MG tablet; Take 0.5 tablets by mouth Daily.  Dispense: 45 tablet; Refill: 1  -     ibuprofen (ADVIL,MOTRIN) 800 MG tablet; Take 1 tablet by mouth Every 8 (Eight) Hours As Needed for Mild Pain.  Dispense: 60 tablet; Refill: 3  -     carbidopa-levodopa (Sinemet)  MG per tablet; Take 1 tablet by mouth 3 (Three) Times a Day.  Dispense: 90 tablet; Refill: 1  -     doxycycline (MONODOX) 100 MG capsule; Take 1 capsule by mouth 2 (Two)  Times a Day.  Dispense: 20 capsule; Refill: 0  -     predniSONE 5 MG (48) tablet therapy pack dose pack; Take 4 tablets by mouth Daily for 2 days, THEN 3 tablets Daily for 2 days, THEN 2 tablets Daily for 2 days, THEN 1 tablet Daily for 2 days.  Dispense: 20 tablet; Refill: 0  -     budesonide-formoterol (Symbicort) 80-4.5 MCG/ACT inhaler; Inhale 2 puffs 2 (Two) Times a Day.  Dispense: 10.2 g; Refill: 12  -     butalbital-acetaminophen-caffeine (FIORICET, ESGIC) -40 MG per tablet; Take 1 tablet by mouth Every 4 (Four) Hours As Needed for Headache.  Dispense: 30 tablet; Refill: 2          Current Outpatient Medications:   •  albuterol sulfate  (90 Base) MCG/ACT inhaler, Inhale 2 puffs Every 4 (Four) Hours As Needed for Wheezing., Disp: 18 g, Rfl: 5  •  atorvastatin (LIPITOR) 20 MG tablet, Take 1 tablet by mouth every night at bedtime., Disp: 90 tablet, Rfl: 1  •  glipizide (GLUCOTROL) 5 MG tablet, Take 0.5 tablets by mouth Daily., Disp: 45 tablet, Rfl: 1  •  ibuprofen (ADVIL,MOTRIN) 800 MG tablet, Take 1 tablet by mouth Every 8 (Eight) Hours As Needed for Mild Pain., Disp: 60 tablet, Rfl: 3  •  Insulin Pen Needle (B-D ULTRAFINE III SHORT PEN) 31G X 8 MM misc, Use as directed with Insulin, Disp: 300 each, Rfl: 2  •  lisinopril (PRINIVIL,ZESTRIL) 10 MG tablet, Take 1 tablet by mouth Daily., Disp: 90 tablet, Rfl: 1  •  Accu-Chek Softclix Lancets lancets, Check BS 3 times daily DX CODE E11.9 Pt is Insulin Dependent, Disp: 100 each, Rfl: 12  •  Blood Glucose Monitoring Suppl (Accu-Chek Guide Me) w/Device kit, 1 Device Take As Directed. Accu-Chek Guide Me Kit Check BS 3 times a day DX Code E11.9.  Pt is Insulin Dependent, Disp: 1 kit, Rfl: 0  •  budesonide-formoterol (Symbicort) 80-4.5 MCG/ACT inhaler, Inhale 2 puffs 2 (Two) Times a Day., Disp: 10.2 g, Rfl: 12  •  butalbital-acetaminophen-caffeine (FIORICET, ESGIC) -40 MG per tablet, Take 1 tablet by mouth Every 4 (Four) Hours As Needed for Headache.,  Disp: 30 tablet, Rfl: 2  •  carbidopa-levodopa (Sinemet)  MG per tablet, Take 1 tablet by mouth 3 (Three) Times a Day., Disp: 90 tablet, Rfl: 1  •  doxycycline (MONODOX) 100 MG capsule, Take 1 capsule by mouth 2 (Two) Times a Day., Disp: 20 capsule, Rfl: 0  •  glucose blood (Accu-Chek Guide) test strip, Accu-Chek Guide Test Strips Check BS 3 times a day DX Code E11.9  Pt is Insulin Dependent, Disp: 300 each, Rfl: 5  •  insulin aspart (novoLOG FLEXPEN) 100 UNIT/ML solution pen-injector sc pen, Inject 2-4 Units under the skin into the appropriate area as directed 3 (Three) Times a Day With Meals. Sliding Scale Lower than 250 = 2 units Greater than 250 = 4 units (Patient not taking: Reported on 3/15/2023), Disp: 5 pen, Rfl: 5  •  predniSONE 5 MG (48) tablet therapy pack dose pack, Take 4 tablets by mouth Daily for 2 days, THEN 3 tablets Daily for 2 days, THEN 2 tablets Daily for 2 days, THEN 1 tablet Daily for 2 days., Disp: 20 tablet, Rfl: 0           GIFTY Khan 3/15/2023 16:09 EDT  This note has been electronically signed

## 2023-03-15 NOTE — PATIENT INSTRUCTIONS
Sinemet 10/100 mg 3 times daily  1 g Rocephin  Doxycycline 100 mg twice daily x10 days  Prednisone 5 mg taper dose monitor blood sugars  Symbicort 80/4.52 puffs twice a day  Call office if nebulizers covered I will call in the medicine for it.  Wear mask when going outside

## 2023-04-01 RX ORDER — LISINOPRIL 10 MG/1
TABLET ORAL
Qty: 90 TABLET | Refills: 1 | Status: SHIPPED | OUTPATIENT
Start: 2023-04-01

## 2023-04-26 ENCOUNTER — TELEPHONE (OUTPATIENT)
Dept: FAMILY MEDICINE CLINIC | Facility: CLINIC | Age: 63
End: 2023-04-26

## 2023-04-26 DIAGNOSIS — R26.89 BALANCE PROBLEM: Primary | ICD-10-CM

## 2023-04-26 NOTE — TELEPHONE ENCOUNTER
Caller: George Damon    Relationship: Self    Best call back number: [phone]    What is the best time to reach you: ANY    Who are you requesting to speak with (clinical staff, provider,  specific staff member): CLINICAL    Do you know the name of the person who called: NONE    What was the call regarding: PATIENT REQUESTING PHYSICAL THERAPY DUE TO BALANCE ISSUES.  PLEASE ADVISE    Do you require a callback: YES

## 2023-08-02 ENCOUNTER — OFFICE VISIT (OUTPATIENT)
Dept: FAMILY MEDICINE CLINIC | Facility: CLINIC | Age: 63
End: 2023-08-02
Payer: MEDICAID

## 2023-08-02 VITALS
DIASTOLIC BLOOD PRESSURE: 78 MMHG | HEART RATE: 60 BPM | TEMPERATURE: 97.2 F | HEIGHT: 66 IN | SYSTOLIC BLOOD PRESSURE: 166 MMHG | WEIGHT: 261.2 LBS | BODY MASS INDEX: 41.98 KG/M2 | OXYGEN SATURATION: 89 %

## 2023-08-02 DIAGNOSIS — M53.3 COCCYX PAIN: ICD-10-CM

## 2023-08-02 DIAGNOSIS — Z79.4 TYPE 2 DIABETES MELLITUS WITH HYPERGLYCEMIA, WITH LONG-TERM CURRENT USE OF INSULIN: ICD-10-CM

## 2023-08-02 DIAGNOSIS — R05.3 CHRONIC COUGH: ICD-10-CM

## 2023-08-02 DIAGNOSIS — M25.561 CHRONIC PAIN OF RIGHT KNEE: ICD-10-CM

## 2023-08-02 DIAGNOSIS — E11.65 TYPE 2 DIABETES MELLITUS WITH HYPERGLYCEMIA, WITH LONG-TERM CURRENT USE OF INSULIN: ICD-10-CM

## 2023-08-02 DIAGNOSIS — G89.29 CHRONIC PAIN OF RIGHT KNEE: ICD-10-CM
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
## 2023-08-02 RX ORDER — ACETAMINOPHEN 500 MG
500 TABLET ORAL EVERY 6 HOURS PRN
Qty: 60 TABLET | Refills: 2 | Status: SHIPPED | OUTPATIENT
Start: 2023-08-02

## 2023-08-02 RX ORDER — IBUPROFEN 800 MG/1
800 TABLET ORAL EVERY 8 HOURS PRN
Qty: 60 TABLET | Refills: 3 | Status: SHIPPED | OUTPATIENT
Start: 2023-08-02

## 2023-08-03 LAB — HBA1C MFR BLD: 8.1 % (ref 4.8–5.6)

## 2023-08-09 DIAGNOSIS — M25.561 CHRONIC PAIN OF RIGHT KNEE: Primary | ICD-10-CM

## 2023-08-09 DIAGNOSIS — G89.29 CHRONIC PAIN OF RIGHT KNEE: Primary | ICD-10-CM

## 2023-08-14 ENCOUNTER — OFFICE VISIT (OUTPATIENT)
Dept: SPORTS MEDICINE | Facility: CLINIC | Age: 63
End: 2023-08-14
Payer: MEDICAID

## 2023-08-14 VITALS — HEIGHT: 65 IN | WEIGHT: 261 LBS | BODY MASS INDEX: 43.49 KG/M2 | OXYGEN SATURATION: 89 % | HEART RATE: 65 BPM

## 2023-08-14 DIAGNOSIS — E11.9 DIABETES MELLITUS TYPE 2, INSULIN DEPENDENT: ICD-10-CM

## 2023-08-14 DIAGNOSIS — M25.561 CHRONIC PAIN OF RIGHT KNEE: ICD-10-CM

## 2023-08-14 DIAGNOSIS — G89.29 CHRONIC PAIN OF RIGHT KNEE: ICD-10-CM

## 2023-08-14 DIAGNOSIS — E66.01 MORBID OBESITY WITH BMI OF 40.0-44.9, ADULT: ICD-10-CM

## 2023-08-14 DIAGNOSIS — Z79.4 DIABETES MELLITUS TYPE 2, INSULIN DEPENDENT: ICD-10-CM

## 2023-08-14 DIAGNOSIS — M17.11 PRIMARY OSTEOARTHRITIS OF RIGHT KNEE: ICD-10-CM

## 2023-08-14 PROCEDURE — 20610 DRAIN/INJ JOINT/BURSA W/O US: CPT | Performed by: STUDENT IN AN ORGANIZED HEALTH CARE EDUCATION/TRAINING PROGRAM

## 2023-08-14 PROCEDURE — 99214 OFFICE O/P EST MOD 30 MIN: CPT | Performed by: STUDENT IN AN ORGANIZED HEALTH CARE EDUCATION/TRAINING PROGRAM

## 2023-08-14 RX ORDER — LIDOCAINE HYDROCHLORIDE 10 MG/ML
4 INJECTION, SOLUTION EPIDURAL; INFILTRATION; INTRACAUDAL; PERINEURAL
Status: DISCONTINUED | OUTPATIENT
Start: 2023-08-14 | End: 2023-08-14 | Stop reason: HOSPADM

## 2023-08-14 RX ORDER — METHYLPREDNISOLONE ACETATE 40 MG/ML
40 INJECTION, SUSPENSION INTRA-ARTICULAR; INTRALESIONAL; INTRAMUSCULAR; SOFT TISSUE
Status: DISCONTINUED | OUTPATIENT
Start: 2023-08-14 | End: 2023-08-14 | Stop reason: HOSPADM

## 2023-08-14 RX ADMIN — METHYLPREDNISOLONE ACETATE 40 MG: 40 INJECTION, SUSPENSION INTRA-ARTICULAR; INTRALESIONAL; INTRAMUSCULAR; SOFT TISSUE at 08:53

## 2023-08-14 RX ADMIN — LIDOCAINE HYDROCHLORIDE 4 ML: 10 INJECTION, SOLUTION EPIDURAL; INFILTRATION; INTRACAUDAL; PERINEURAL at 08:53

## 2023-08-14 NOTE — PROGRESS NOTES
Procedure   Large Joint Arthrocentesis: R knee  Date/Time: 8/14/2023 8:53 AM  Consent given by: patient  Site marked: site marked  Timeout: Immediately prior to procedure a time out was called to verify the correct patient, procedure, equipment, support staff and site/side marked as required   Supporting Documentation  Indications: pain   Procedure Details  Location: knee - R knee  Preparation: Patient was prepped and draped in the usual sterile fashion  Needle size: 25 G  Approach: anterolateral  Medications administered: 4 mL lidocaine PF 1% 1 %; 40 mg methylPREDNISolone acetate 40 MG/ML  Patient tolerance: patient tolerated the procedure well with no immediate complications

## 2023-08-24 ENCOUNTER — TELEPHONE (OUTPATIENT)
Dept: FAMILY MEDICINE CLINIC | Facility: CLINIC | Age: 63
End: 2023-08-24

## 2023-08-24 NOTE — TELEPHONE ENCOUNTER
Caller: DERECK    Relationship to patient:     Best call back number: [phone]    Patient is needing: PATIENT'S COUSIN CALLED AND SAID THAT HIS INSURANCE IS REQUIRING MORE INFORMATION ON HIS BLOOD GLUCOSE TEST STRIPS    PATIENT CHECKS HIS BLOOD SUGAR FOUR TIMES A DAY, AND INSURANCE WILL ONLY COVER TWICE A DAY, THEY NEED INFORMATION FROM JOANIE SERNA TO BE ABLE TO COVER THESE FOR FOUR TIMES A DAY

## 2023-08-24 NOTE — TELEPHONE ENCOUNTER
Unable to speak to cousin due to not being on his HIPAA release. Also there is nothing from the pharmacy saying  he needs a prior auth for the strips. Per the script he is only to check three times daily

## 2023-10-11 RX ORDER — LISINOPRIL 10 MG/1
10 TABLET ORAL DAILY
Qty: 90 TABLET | Refills: 0 | Status: SHIPPED | OUTPATIENT
Start: 2023-10-11

## 2023-10-11 RX ORDER — GLIPIZIDE 5 MG/1
2.5 TABLET ORAL DAILY
Qty: 45 TABLET | Refills: 0 | Status: SHIPPED | OUTPATIENT
Start: 2023-10-11

## 2023-10-17 RX ORDER — ATORVASTATIN CALCIUM 20 MG/1
20 TABLET, FILM COATED ORAL
Qty: 90 TABLET | Refills: 1 | Status: SHIPPED | OUTPATIENT
Start: 2023-10-17

## 2024-01-09 ENCOUNTER — OFFICE VISIT (OUTPATIENT)
Dept: FAMILY MEDICINE CLINIC | Facility: CLINIC | Age: 64
End: 2024-01-09
Payer: MEDICAID

## 2024-01-09 VITALS
HEART RATE: 67 BPM | OXYGEN SATURATION: 92 % | BODY MASS INDEX: 43.65 KG/M2 | HEIGHT: 65 IN | TEMPERATURE: 97.4 F | DIASTOLIC BLOOD PRESSURE: 76 MMHG | WEIGHT: 262 LBS | SYSTOLIC BLOOD PRESSURE: 126 MMHG

## 2024-01-09 DIAGNOSIS — E78.2 MIXED HYPERLIPIDEMIA: ICD-10-CM

## 2024-01-09 DIAGNOSIS — Z00.00 PREVENTATIVE HEALTH CARE: ICD-10-CM

## 2024-01-09 DIAGNOSIS — E11.65 TYPE 2 DIABETES MELLITUS WITH HYPERGLYCEMIA, WITH LONG-TERM CURRENT USE OF INSULIN: Primary | ICD-10-CM

## 2024-01-09 DIAGNOSIS — Z79.4 TYPE 2 DIABETES MELLITUS WITH HYPERGLYCEMIA, WITH LONG-TERM CURRENT USE OF INSULIN: Primary | ICD-10-CM

## 2024-01-09 PROCEDURE — 99213 OFFICE O/P EST LOW 20 MIN: CPT | Performed by: NURSE PRACTITIONER

## 2024-01-09 PROCEDURE — 3078F DIAST BP <80 MM HG: CPT | Performed by: NURSE PRACTITIONER

## 2024-01-09 PROCEDURE — 3074F SYST BP LT 130 MM HG: CPT | Performed by: NURSE PRACTITIONER

## 2024-01-09 PROCEDURE — T1015 CLINIC SERVICE: HCPCS | Performed by: NURSE PRACTITIONER

## 2024-01-09 RX ORDER — BLOOD SUGAR DIAGNOSTIC
STRIP MISCELLANEOUS
Qty: 300 EACH | Refills: 5 | Status: SHIPPED | OUTPATIENT
Start: 2024-01-09

## 2024-01-09 RX ORDER — ALBUTEROL SULFATE 90 UG/1
2 AEROSOL, METERED RESPIRATORY (INHALATION) EVERY 4 HOURS PRN
Qty: 18 G | Refills: 5 | Status: SHIPPED | OUTPATIENT
Start: 2024-01-09

## 2024-01-09 RX ORDER — LISINOPRIL 10 MG/1
10 TABLET ORAL DAILY
Qty: 90 TABLET | Refills: 0 | Status: SHIPPED | OUTPATIENT
Start: 2024-01-09

## 2024-01-09 RX ORDER — GLIPIZIDE 5 MG/1
2.5 TABLET ORAL DAILY
Qty: 45 TABLET | Refills: 0 | Status: SHIPPED | OUTPATIENT
Start: 2024-01-09

## 2024-01-09 RX ORDER — BUDESONIDE AND FORMOTEROL FUMARATE DIHYDRATE 80; 4.5 UG/1; UG/1
2 AEROSOL RESPIRATORY (INHALATION)
Qty: 10.2 G | Refills: 12 | Status: SHIPPED | OUTPATIENT
Start: 2024-01-09

## 2024-01-09 NOTE — PATIENT INSTRUCTIONS
Fasting blood work  Refill medications  If test strips are not covered call insurance company to find out why  Follow-up 6 months

## 2024-01-10 ENCOUNTER — TELEPHONE (OUTPATIENT)
Dept: FAMILY MEDICINE CLINIC | Facility: CLINIC | Age: 64
End: 2024-01-10
Payer: MEDICAID

## 2024-01-10 LAB
ALBUMIN SERPL-MCNC: 4.4 G/DL (ref 3.9–4.9)
ALBUMIN/GLOB SERPL: 2 {RATIO} (ref 1.2–2.2)
ALP SERPL-CCNC: 83 IU/L (ref 44–121)
ALT SERPL-CCNC: 12 IU/L (ref 0–44)
AST SERPL-CCNC: 13 IU/L (ref 0–40)
BASOPHILS # BLD AUTO: 0 X10E3/UL (ref 0–0.2)
BASOPHILS NFR BLD AUTO: 0 %
BILIRUB SERPL-MCNC: 0.4 MG/DL (ref 0–1.2)
BUN SERPL-MCNC: 15 MG/DL (ref 8–27)
BUN/CREAT SERPL: 20 (ref 10–24)
CALCIUM SERPL-MCNC: 9.4 MG/DL (ref 8.6–10.2)
CHLORIDE SERPL-SCNC: 100 MMOL/L (ref 96–106)
CHOLEST SERPL-MCNC: 140 MG/DL (ref 100–199)
CO2 SERPL-SCNC: 23 MMOL/L (ref 20–29)
CREAT SERPL-MCNC: 0.74 MG/DL (ref 0.76–1.27)
EGFRCR SERPLBLD CKD-EPI 2021: 103 ML/MIN/1.73
EOSINOPHIL # BLD AUTO: 0.1 X10E3/UL (ref 0–0.4)
EOSINOPHIL NFR BLD AUTO: 2 %
ERYTHROCYTE [DISTWIDTH] IN BLOOD BY AUTOMATED COUNT: 13.7 % (ref 11.6–15.4)
GLOBULIN SER CALC-MCNC: 2.2 G/DL (ref 1.5–4.5)
GLUCOSE SERPL-MCNC: 140 MG/DL (ref 70–99)
HBA1C MFR BLD: 7.9 % (ref 4.8–5.6)
HCT VFR BLD AUTO: 45.5 % (ref 37.5–51)
HDLC SERPL-MCNC: 50 MG/DL
HGB BLD-MCNC: 14.8 G/DL (ref 13–17.7)
IMM GRANULOCYTES # BLD AUTO: 0 X10E3/UL (ref 0–0.1)
IMM GRANULOCYTES NFR BLD AUTO: 0 %
LDLC SERPL CALC-MCNC: 72 MG/DL (ref 0–99)
LDLC/HDLC SERPL: 1.4 RATIO (ref 0–3.6)
LYMPHOCYTES # BLD AUTO: 1.4 X10E3/UL (ref 0.7–3.1)
LYMPHOCYTES NFR BLD AUTO: 20 %
MCH RBC QN AUTO: 28.8 PG (ref 26.6–33)
MCHC RBC AUTO-ENTMCNC: 32.5 G/DL (ref 31.5–35.7)
MCV RBC AUTO: 89 FL (ref 79–97)
MONOCYTES # BLD AUTO: 0.5 X10E3/UL (ref 0.1–0.9)
MONOCYTES NFR BLD AUTO: 7 %
NEUTROPHILS # BLD AUTO: 4.8 X10E3/UL (ref 1.4–7)
NEUTROPHILS NFR BLD AUTO: 71 %
PLATELET # BLD AUTO: 274 X10E3/UL (ref 150–450)
POTASSIUM SERPL-SCNC: 4.9 MMOL/L (ref 3.5–5.2)
PROT SERPL-MCNC: 6.6 G/DL (ref 6–8.5)
RBC # BLD AUTO: 5.14 X10E6/UL (ref 4.14–5.8)
SODIUM SERPL-SCNC: 137 MMOL/L (ref 134–144)
TRIGL SERPL-MCNC: 99 MG/DL (ref 0–149)
VLDLC SERPL CALC-MCNC: 18 MG/DL (ref 5–40)
WBC # BLD AUTO: 6.9 X10E3/UL (ref 3.4–10.8)

## 2024-01-10 NOTE — TELEPHONE ENCOUNTER
Caller: OTHER    Relationship: Other    Best call back number: [phone]    What is the best time to reach you: MONDAY THRU FRIDAY 9 AM TO 5 PM    Who are you requesting to speak with (clinical staff, provider,  specific staff member): CLINICAL STAFF     Do you know the name of the person who called: SELF     What was the call regarding: DIANN FROM [facility] CALLED AND STATED IF PATIENT KEPT HIS APPOINTMENT WITH YOU ON 1/8/24. DIANN ALSO HAS SOME QUESTIONS CONCERNING HIS OXYGEN    Is it okay if the provider responds through MyChart: N/A

## 2024-01-10 NOTE — TELEPHONE ENCOUNTER
I called Maria E back with [facility]. Maria E already left for the day but I left [facility] [place] msg they left us.

## 2024-01-11 NOTE — TELEPHONE ENCOUNTER
I called her back again, she was not available. I asked if they could look in chart and see what she needs to know so that I can put here so when she calls back again there will be information ready to give her.     She did look it up, they just needed chart notes from last OV that discusses oxygen. I faxed to [phone]

## 2024-01-23 ENCOUNTER — TELEPHONE (OUTPATIENT)
Dept: FAMILY MEDICINE CLINIC | Facility: CLINIC | Age: 64
End: 2024-01-23
Payer: MEDICAID

## 2024-01-23 NOTE — TELEPHONE ENCOUNTER
Caller: GURU    Relationship: Other    Best call back number: [phone]    GURU, WITH [facility] CALLED, STATING THEY RECEIVED PATIENT'S LAST OV NOTES THAT DISCUSSED PATIENTS OXYGEN- THIS WAS FROM OVER A YEAR AGO.    IF THE PATIENT HAS NOT BEEN SEEN IN OFFICE SINCE, TO DISCUSS HIS OXYGEN, PATIENT NEEDS TO BE CONTACTED PER OUR OFFICE TO GET SCHEDULED FOR AN APPOINTMENT TO DO SO.    IF NOT, GURU WILL NOT HAVE THE CORRECT PAPERWORK TO FILE WITH THE INSURANCE COMPANY FOR THEM TO CONTINUE PAYING FOR PATIENT'S OXYGEN.    PLEASE GIVE GURU A CALLBACK ONCE THIS HAS BEEN DONE SO SHE CAN UPDATE THEIR NOTES.

## 2024-01-30 NOTE — TELEPHONE ENCOUNTER
Name: George Damon    Relationship: Self    Best Callback Number: [phone]    HUB PROVIDED THE RELAY MESSAGE FROM THE OFFICE   PATIENT SCHEDULED AS REQUESTED    ADDITIONAL INFORMATION:   APPT SET FOR 2.5.24

## 2024-01-30 NOTE — TELEPHONE ENCOUNTER
"RELAY    I just left patient a very detailed voicemail explaining that for insurance to cover the oxygen, they need a face to face visit, the last one regarding oxygen, was too long ago.    Patient needs to make an office visit appt with Radha for \"oxygen face to face appt\"  "

## 2024-02-05 ENCOUNTER — OFFICE VISIT (OUTPATIENT)
Dept: FAMILY MEDICINE CLINIC | Facility: CLINIC | Age: 64
End: 2024-02-05
Payer: MEDICAID

## 2024-02-05 VITALS
HEART RATE: 87 BPM | HEIGHT: 65 IN | DIASTOLIC BLOOD PRESSURE: 84 MMHG | BODY MASS INDEX: 43.65 KG/M2 | WEIGHT: 262 LBS | OXYGEN SATURATION: 84 % | SYSTOLIC BLOOD PRESSURE: 146 MMHG | TEMPERATURE: 97.6 F

## 2024-02-05 DIAGNOSIS — Z12.5 SCREENING PSA (PROSTATE SPECIFIC ANTIGEN): Primary | ICD-10-CM

## 2024-02-05 DIAGNOSIS — J44.9 COPD MIXED TYPE: ICD-10-CM

## 2024-02-05 DIAGNOSIS — J44.1 ACUTE EXACERBATION OF CHRONIC OBSTRUCTIVE PULMONARY DISEASE: ICD-10-CM

## 2024-02-05 DIAGNOSIS — R09.02 HYPOXEMIA: ICD-10-CM

## 2024-02-05 PROCEDURE — 3051F HG A1C>EQUAL 7.0%<8.0%: CPT | Performed by: NURSE PRACTITIONER

## 2024-02-05 PROCEDURE — 3077F SYST BP >= 140 MM HG: CPT | Performed by: NURSE PRACTITIONER

## 2024-02-05 PROCEDURE — 99213 OFFICE O/P EST LOW 20 MIN: CPT | Performed by: NURSE PRACTITIONER

## 2024-02-05 PROCEDURE — T1015 CLINIC SERVICE: HCPCS | Performed by: NURSE PRACTITIONER

## 2024-02-05 PROCEDURE — 3079F DIAST BP 80-89 MM HG: CPT | Performed by: NURSE PRACTITIONER

## 2024-02-05 RX ORDER — IPRATROPIUM BROMIDE AND ALBUTEROL SULFATE 2.5; .5 MG/3ML; MG/3ML
3 SOLUTION RESPIRATORY (INHALATION) EVERY 4 HOURS PRN
Qty: 360 ML | Refills: 6 | Status: SHIPPED | OUTPATIENT
Start: 2024-02-05

## 2024-02-05 NOTE — PROGRESS NOTES
"    George Damon is a 63 y.o. male.     History of Present Illness  63-year-old white male smoker with history of type 2 diabetes, COPD, CAD/hypertension, CHF and hyperlipidemia who comes in today for face-to-face visit for oxygen recertification.  Patient currently only wearing 3 L of oxygen at night however it was ordered for 24/7 use.  His room air oxygen today was 84% at rest and I stressed to patient he needs to wear his oxygen all the time at home.  I also ordered patient a new nebulizer so he can do his DuoNeb breathing treatments since he does have wheezing and mild rhonchi    Blood pressure 146/84 heart rate 82 oxygen level 84 on room air he denies any chest pain, tachycardia or dizziness    Patient has not been checking blood sugars since he cannot find his glucometer.  His last A1c was 7.9 blood sugar 140    Weight is 262 with a BMI of 43.6.  He has had 3 COVID vaccines up-to-date on eye exam we will be doing PSA today and he still declines colon cancer screening          DuoNeb treatments twice a day/home nebulizer  Recertify for oxygen 3 L around-the-clock  Blood work today  Diet and exercise  Stop smoking             The following portions of the patient's history were reviewed and updated as appropriate: allergies, current medications, past family history, past medical history, past social history, past surgical history, and problem list.    Vitals:    02/05/24 1511   BP: 146/84   BP Location: Left arm   Patient Position: Sitting   Cuff Size: Large Adult   Pulse: 87   Temp: 97.6 °F (36.4 °C)   TempSrc: Temporal   SpO2: (!) 84%   Weight: 119 kg (262 lb)   Height: 165.1 cm (65\")     Body mass index is 43.6 kg/m².    Past Medical History:   Diagnosis Date    Accidental fall     Acute respiratory failure with hypercapnia     CAD (coronary artery disease)     Congestive heart failure     COPD with acute exacerbation     Diabetes mellitus, type II     Diastolic dysfunction     Enlarged heart     Eye " pain, left     GERD (gastroesophageal reflux disease)     (gastric reflex)    Headache     Hyperlipidemia     Hypertension     Knee pain, left     Lumbar back pain     With radiculopathy    Nicotine abuse     Dependence    Nicotine dependence 10/9/2015    Obesity     Onychauxis     Pain in joint of right knee     Pneumonia, bacterial     Rib pain on right side     Toe infection     Wrist pain, left      Past Surgical History:   Procedure Laterality Date    CARDIAC CATHETERIZATION  08/18/2015     Family History   Problem Relation Age of Onset    Diabetes Other     Cancer Other     Heart disease Father      Immunization History   Administered Date(s) Administered    Tdap 05/25/2017       Office Visit on 01/09/2024   Component Date Value Ref Range Status    WBC 01/09/2024 6.9  3.4 - 10.8 x10E3/uL Final    RBC 01/09/2024 5.14  4.14 - 5.80 x10E6/uL Final    Hemoglobin 01/09/2024 14.8  13.0 - 17.7 g/dL Final    Hematocrit 01/09/2024 45.5  37.5 - 51.0 % Final    MCV 01/09/2024 89  79 - 97 fL Final    MCH 01/09/2024 28.8  26.6 - 33.0 pg Final    MCHC 01/09/2024 32.5  31.5 - 35.7 g/dL Final    RDW 01/09/2024 13.7  11.6 - 15.4 % Final    Platelets 01/09/2024 274  150 - 450 x10E3/uL Final    Neutrophil Rel % 01/09/2024 71  Not Estab. % Final    Lymphocyte Rel % 01/09/2024 20  Not Estab. % Final    Monocyte Rel % 01/09/2024 7  Not Estab. % Final    Eosinophil Rel % 01/09/2024 2  Not Estab. % Final    Basophil Rel % 01/09/2024 0  Not Estab. % Final    Neutrophils Absolute 01/09/2024 4.8  1.4 - 7.0 x10E3/uL Final    Lymphocytes Absolute 01/09/2024 1.4  0.7 - 3.1 x10E3/uL Final    Monocytes Absolute 01/09/2024 0.5  0.1 - 0.9 x10E3/uL Final    Eosinophils Absolute 01/09/2024 0.1  0.0 - 0.4 x10E3/uL Final    Basophils Absolute 01/09/2024 0.0  0.0 - 0.2 x10E3/uL Final    Immature Granulocyte Rel % 01/09/2024 0  Not Estab. % Final    Immature Grans Absolute 01/09/2024 0.0  0.0 - 0.1 x10E3/uL Final    Glucose 01/09/2024 140 (H)  70 -  99 mg/dL Final    BUN 01/09/2024 15  8 - 27 mg/dL Final    Creatinine 01/09/2024 0.74 (L)  0.76 - 1.27 mg/dL Final    EGFR Result 01/09/2024 103  >59 mL/min/1.73 Final    BUN/Creatinine Ratio 01/09/2024 20  10 - 24 Final    Sodium 01/09/2024 137  134 - 144 mmol/L Final    Potassium 01/09/2024 4.9  3.5 - 5.2 mmol/L Final    Chloride 01/09/2024 100  96 - 106 mmol/L Final    Total CO2 01/09/2024 23  20 - 29 mmol/L Final    Calcium 01/09/2024 9.4  8.6 - 10.2 mg/dL Final    Total Protein 01/09/2024 6.6  6.0 - 8.5 g/dL Final    Albumin 01/09/2024 4.4  3.9 - 4.9 g/dL Final    Globulin 01/09/2024 2.2  1.5 - 4.5 g/dL Final    A/G Ratio 01/09/2024 2.0  1.2 - 2.2 Final    Total Bilirubin 01/09/2024 0.4  0.0 - 1.2 mg/dL Final    Alkaline Phosphatase 01/09/2024 83  44 - 121 IU/L Final    AST (SGOT) 01/09/2024 13  0 - 40 IU/L Final    ALT (SGPT) 01/09/2024 12  0 - 44 IU/L Final    Total Cholesterol 01/09/2024 140  100 - 199 mg/dL Final    Triglycerides 01/09/2024 99  0 - 149 mg/dL Final    HDL Cholesterol 01/09/2024 50  >39 mg/dL Final    VLDL Cholesterol Jono 01/09/2024 18  5 - 40 mg/dL Final    LDL Chol Calc (NIH) 01/09/2024 72  0 - 99 mg/dL Final    LDL/HDL RATIO 01/09/2024 1.4  0.0 - 3.6 ratio Final    Comment:                                     LDL/HDL Ratio                                              Men  Women                                1/2 Avg.Risk  1.0    1.5                                    Avg.Risk  3.6    3.2                                 2X Avg.Risk  6.2    5.0                                 3X Avg.Risk  8.0    6.1      Hemoglobin A1C 01/09/2024 7.9 (H)  4.8 - 5.6 % Final    Comment:          Prediabetes: 5.7 - 6.4           Diabetes: >6.4           Glycemic control for adults with diabetes: <7.0           Review of Systems   Constitutional: Negative.    HENT: Negative.     Respiratory:  Positive for shortness of breath.    Cardiovascular: Negative.    Gastrointestinal: Negative.    Genitourinary: Negative.     Musculoskeletal: Negative.    Skin: Negative.    Neurological: Negative.    Psychiatric/Behavioral: Negative.         Objective   Physical Exam  Constitutional:       Appearance: Normal appearance.   HENT:      Head: Normocephalic.   Cardiovascular:      Rate and Rhythm: Normal rate and regular rhythm.      Pulses: Normal pulses.      Heart sounds: Normal heart sounds.   Pulmonary:      Effort: Pulmonary effort is normal.      Comments: Wheezing throughout  Abdominal:      General: Bowel sounds are normal.   Musculoskeletal:         General: Normal range of motion.   Skin:     General: Skin is warm.   Neurological:      General: No focal deficit present.      Mental Status: He is alert and oriented to person, place, and time.   Psychiatric:         Mood and Affect: Mood normal.         Behavior: Behavior normal.         Procedures    Assessment & Plan   Diagnoses and all orders for this visit:    1. Screening PSA (prostate specific antigen) (Primary)  -     PSA Screen    2. Acute exacerbation of chronic obstructive pulmonary disease  -     Cancel: Home Nebulizer  -     Home Nebulizer    3. COPD mixed type    4. Hypoxemia    Other orders  -     ipratropium-albuterol (DUO-NEB) 0.5-2.5 mg/3 ml nebulizer; Take 3 mL by nebulization Every 4 (Four) Hours As Needed for Wheezing.  Dispense: 360 mL; Refill: 6           Current Outpatient Medications:     Accu-Chek Softclix Lancets lancets, Check BS 3 times daily DX CODE E11.9 Pt is Insulin Dependent, Disp: 100 each, Rfl: 12    acetaminophen (TYLENOL) 500 MG tablet, Take 1 tablet by mouth Every 6 (Six) Hours As Needed for Mild Pain., Disp: 60 tablet, Rfl: 2    albuterol sulfate  (90 Base) MCG/ACT inhaler, Inhale 2 puffs Every 4 (Four) Hours As Needed for Wheezing., Disp: 18 g, Rfl: 5    atorvastatin (LIPITOR) 20 MG tablet, TAKE 1 TABLET BY MOUTH EVERYDAY AT BEDTIME, Disp: 90 tablet, Rfl: 1    Blood Glucose Monitoring Suppl (Accu-Chek Guide Me) w/Device kit, 1 Device  Take As Directed. Accu-Chek Guide Me Kit Check BS 3 times a day DX Code E11.9.  Pt is Insulin Dependent, Disp: 1 kit, Rfl: 0    budesonide-formoterol (Symbicort) 80-4.5 MCG/ACT inhaler, Inhale 2 puffs 2 (Two) Times a Day., Disp: 10.2 g, Rfl: 12    butalbital-acetaminophen-caffeine (FIORICET, ESGIC) -40 MG per tablet, Take 1 tablet by mouth Every 4 (Four) Hours As Needed for Headache., Disp: 30 tablet, Rfl: 2    carbidopa-levodopa (Sinemet)  MG per tablet, Take 1 tablet by mouth 3 (Three) Times a Day., Disp: 90 tablet, Rfl: 1    glipizide (GLUCOTROL) 5 MG tablet, Take 0.5 tablets by mouth Daily., Disp: 45 tablet, Rfl: 0    glucose blood (Accu-Chek Guide) test strip, Accu-Chek Guide Test Strips Check BS 3 times a day DX Code E11.9  Pt is Insulin Dependent, Disp: 300 each, Rfl: 5    ibuprofen (ADVIL,MOTRIN) 800 MG tablet, Take 1 tablet by mouth Every 8 (Eight) Hours As Needed for Mild Pain., Disp: 60 tablet, Rfl: 3    insulin aspart (novoLOG FLEXPEN) 100 UNIT/ML solution pen-injector sc pen, Inject 2-4 Units under the skin into the appropriate area as directed 3 (Three) Times a Day With Meals. Sliding Scale Lower than 250 = 2 units Greater than 250 = 4 units, Disp: 5 pen, Rfl: 5    Insulin Pen Needle (B-D ULTRAFINE III SHORT PEN) 31G X 8 MM misc, Use as directed with Insulin, Disp: 300 each, Rfl: 2    lisinopril (PRINIVIL,ZESTRIL) 10 MG tablet, Take 1 tablet by mouth Daily., Disp: 90 tablet, Rfl: 0    ipratropium-albuterol (DUO-NEB) 0.5-2.5 mg/3 ml nebulizer, Take 3 mL by nebulization Every 4 (Four) Hours As Needed for Wheezing., Disp: 360 mL, Rfl: 6           Radha Ryder, APRN 2/5/2024 16:23 EST  This note has been electronically signed

## 2024-02-05 NOTE — PATIENT INSTRUCTIONS
DuoNeb treatments twice a day/home nebulizer  Recertify for oxygen 3 L around-the-clock  Blood work today  Diet and exercise  Stop smoking

## 2024-02-06 NOTE — TELEPHONE ENCOUNTER
I called Carmen to find out if they received the Recert for oxygen forms, since patient had an appt yesterday and I was out of the office. She said yes she did. (I did not see it in chart, nor on desk).  Also, Carmen asked for a copy of insurance card. She said the one faxed yesterday was patients OLD ins card ([employer]). I printed the [facility] card to her.

## 2024-02-08 ENCOUNTER — TELEPHONE (OUTPATIENT)
Dept: FAMILY MEDICINE CLINIC | Facility: CLINIC | Age: 64
End: 2024-02-08
Payer: MEDICAID

## 2024-02-08 NOTE — TELEPHONE ENCOUNTER
Caller: GURU    Relationship:     Best call back number: [phone]    What form or medical record are you requesting: [facility]     Who is requesting this form or medical record from you: PA FOR OXYGEN SIGNED AND RETURNED, HAS TO BE SIGNED BY AN MD    How would you like to receive the form or medical records (pick-up, mail, fax): FAX  If fax, what is the fax number: [phone]  If mail, what is the address:   If pick-up, provide patient with address and location details    Timeframe paperwork needed: AS SOON AS POSSIBLE, FAXING 2/8/24    Additional notes:   PA FOR OXYGEN SIGNED AND RETURNED, HAS TO BE SIGNED BY AN MD

## 2024-03-25 RX ORDER — GLIPIZIDE 5 MG/1
2.5 TABLET ORAL DAILY
Qty: 45 TABLET | Refills: 0 | Status: SHIPPED | OUTPATIENT
Start: 2024-03-25

## 2024-03-25 RX ORDER — ATORVASTATIN CALCIUM 20 MG/1
20 TABLET, FILM COATED ORAL
Qty: 90 TABLET | Refills: 0 | Status: SHIPPED | OUTPATIENT
Start: 2024-03-25

## 2024-03-25 RX ORDER — LISINOPRIL 10 MG/1
10 TABLET ORAL DAILY
Qty: 90 TABLET | Refills: 0 | Status: SHIPPED | OUTPATIENT
Start: 2024-03-25

## 2024-08-21 ENCOUNTER — OFFICE VISIT (OUTPATIENT)
Dept: FAMILY MEDICINE CLINIC | Facility: CLINIC | Age: 64
End: 2024-08-21
Payer: MEDICAID

## 2024-08-21 VITALS
BODY MASS INDEX: 43.99 KG/M2 | HEIGHT: 65 IN | OXYGEN SATURATION: 92 % | HEART RATE: 106 BPM | DIASTOLIC BLOOD PRESSURE: 72 MMHG | TEMPERATURE: 97.5 F | WEIGHT: 264 LBS | SYSTOLIC BLOOD PRESSURE: 110 MMHG

## 2024-08-21 DIAGNOSIS — E66.01 CLASS 3 SEVERE OBESITY DUE TO EXCESS CALORIES WITH SERIOUS COMORBIDITY AND BODY MASS INDEX (BMI) OF 40.0 TO 44.9 IN ADULT: ICD-10-CM

## 2024-08-21 DIAGNOSIS — S82.891D CLOSED FRACTURE OF RIGHT ANKLE WITH ROUTINE HEALING: Primary | ICD-10-CM

## 2024-08-21 RX ORDER — GLIPIZIDE 5 MG/1
2.5 TABLET ORAL DAILY
Qty: 90 TABLET | Refills: 1 | Status: SHIPPED | OUTPATIENT
Start: 2024-08-21

## 2024-08-21 RX ORDER — ATORVASTATIN CALCIUM 20 MG/1
20 TABLET, FILM COATED ORAL
Qty: 90 TABLET | Refills: 0 | Status: SHIPPED | OUTPATIENT
Start: 2024-08-21 | End: 2024-08-21 | Stop reason: SDUPTHER

## 2024-08-21 RX ORDER — GLIPIZIDE 5 MG/1
2.5 TABLET ORAL DAILY
Qty: 45 TABLET | Refills: 0 | Status: SHIPPED | OUTPATIENT
Start: 2024-08-21 | End: 2024-08-21 | Stop reason: SDUPTHER

## 2024-08-21 RX ORDER — LISINOPRIL 10 MG/1
10 TABLET ORAL DAILY
Qty: 90 TABLET | Refills: 1 | Status: SHIPPED | OUTPATIENT
Start: 2024-08-21

## 2024-08-21 RX ORDER — LISINOPRIL 10 MG/1
10 TABLET ORAL DAILY
Qty: 90 TABLET | Refills: 0 | Status: SHIPPED | OUTPATIENT
Start: 2024-08-21 | End: 2024-08-21 | Stop reason: SDUPTHER

## 2024-08-21 RX ORDER — ATORVASTATIN CALCIUM 20 MG/1
20 TABLET, FILM COATED ORAL
Qty: 90 TABLET | Refills: 1 | Status: SHIPPED | OUTPATIENT
Start: 2024-08-21

## 2024-08-21 NOTE — PROGRESS NOTES
"    George Damon is a 64 y.o. male.     History of Present Illness  64-year-old white male smoker with history of type 2 diabetes, COPD, CAD/hypertension, CHF and hyperlipidemia comes in today for 6-month follow-up visit fasting blood work    Blood pressure 110/72 heart rate 106 he denies any chest pain, dyspnea, tachycardia or dizziness    Back in June patient broke his right ankle went to either Ortho or podiatry here in town with another system.  X-ray was done there he was casted for a while cast came off and gave him a walking boot.  Unfortunately nothing was sent to me so I have no idea exactly what is going on with the ankle they did do a follow-up x-ray after the cast was taken off.  The walking boot they give him was too large so he did not wear it because his foot slid around and it.  They told him he did not have any other sizes.  His ankle is still swollen and still hurts a great deal to walk.  I did give him a walking boot here in the office that sitting told him to keep it on and ice his ankle a lot more than he has been.  I am going to refer him to Ortho however patient has lost his phone since he gets a new phone he will call me    He has had 3 COVID vaccines up-to-date on eye exam PSA is due in February 2025 and he still declines colon cancer screening            Fasting blood work  Wear walking boot until seen for second opinion by Ortho  Call office when you get your phone back  Follow-up 6 months           The following portions of the patient's history were reviewed and updated as appropriate: allergies, current medications, past family history, past medical history, past social history, past surgical history, and problem list.    Vitals:    08/21/24 1334   BP: 110/72   BP Location: Right arm   Patient Position: Sitting   Cuff Size: Adult   Pulse: 106   Temp: 97.5 °F (36.4 °C)   TempSrc: Infrared   SpO2: 92%   Weight: 120 kg (264 lb)   Height: 165.1 cm (65\")     Body mass index is 43.93 " kg/m².  Class 3 Severe Obesity (BMI >=40). Obesity-related health conditions include the following: hypertension and diabetes mellitus. Obesity is unchanged. BMI is is above average; no BMI management plan is appropriate. We discussed low calorie, low carb based diet program, portion control, and increasing exercise.       Past Medical History:   Diagnosis Date    Accidental fall     Acute respiratory failure with hypercapnia     CAD (coronary artery disease)     Congestive heart failure     COPD with acute exacerbation     Diabetes mellitus, type II     Diastolic dysfunction     Enlarged heart     Eye pain, left     GERD (gastroesophageal reflux disease)     (gastric reflex)    Headache     Hyperlipidemia     Hypertension     Knee pain, left     Lumbar back pain     With radiculopathy    Nicotine abuse     Dependence    Nicotine dependence 10/9/2015    Obesity     Onychauxis     Pain in joint of right knee     Pneumonia, bacterial     Rib pain on right side     Toe infection     Wrist pain, left      Past Surgical History:   Procedure Laterality Date    CARDIAC CATHETERIZATION  08/18/2015     Family History   Problem Relation Age of Onset    Diabetes Other     Cancer Other     Heart disease Father      Immunization History   Administered Date(s) Administered    Tdap 05/25/2017       Office Visit on 01/09/2024   Component Date Value Ref Range Status    WBC 01/09/2024 6.9  3.4 - 10.8 x10E3/uL Final    RBC 01/09/2024 5.14  4.14 - 5.80 x10E6/uL Final    Hemoglobin 01/09/2024 14.8  13.0 - 17.7 g/dL Final    Hematocrit 01/09/2024 45.5  37.5 - 51.0 % Final    MCV 01/09/2024 89  79 - 97 fL Final    MCH 01/09/2024 28.8  26.6 - 33.0 pg Final    MCHC 01/09/2024 32.5  31.5 - 35.7 g/dL Final    RDW 01/09/2024 13.7  11.6 - 15.4 % Final    Platelets 01/09/2024 274  150 - 450 x10E3/uL Final    Neutrophil Rel % 01/09/2024 71  Not Estab. % Final    Lymphocyte Rel % 01/09/2024 20  Not Estab. % Final    Monocyte Rel % 01/09/2024 7   Not Estab. % Final    Eosinophil Rel % 01/09/2024 2  Not Estab. % Final    Basophil Rel % 01/09/2024 0  Not Estab. % Final    Neutrophils Absolute 01/09/2024 4.8  1.4 - 7.0 x10E3/uL Final    Lymphocytes Absolute 01/09/2024 1.4  0.7 - 3.1 x10E3/uL Final    Monocytes Absolute 01/09/2024 0.5  0.1 - 0.9 x10E3/uL Final    Eosinophils Absolute 01/09/2024 0.1  0.0 - 0.4 x10E3/uL Final    Basophils Absolute 01/09/2024 0.0  0.0 - 0.2 x10E3/uL Final    Immature Granulocyte Rel % 01/09/2024 0  Not Estab. % Final    Immature Grans Absolute 01/09/2024 0.0  0.0 - 0.1 x10E3/uL Final    Glucose 01/09/2024 140 (H)  70 - 99 mg/dL Final    BUN 01/09/2024 15  8 - 27 mg/dL Final    Creatinine 01/09/2024 0.74 (L)  0.76 - 1.27 mg/dL Final    EGFR Result 01/09/2024 103  >59 mL/min/1.73 Final    BUN/Creatinine Ratio 01/09/2024 20  10 - 24 Final    Sodium 01/09/2024 137  134 - 144 mmol/L Final    Potassium 01/09/2024 4.9  3.5 - 5.2 mmol/L Final    Chloride 01/09/2024 100  96 - 106 mmol/L Final    Total CO2 01/09/2024 23  20 - 29 mmol/L Final    Calcium 01/09/2024 9.4  8.6 - 10.2 mg/dL Final    Total Protein 01/09/2024 6.6  6.0 - 8.5 g/dL Final    Albumin 01/09/2024 4.4  3.9 - 4.9 g/dL Final    Globulin 01/09/2024 2.2  1.5 - 4.5 g/dL Final    A/G Ratio 01/09/2024 2.0  1.2 - 2.2 Final    Total Bilirubin 01/09/2024 0.4  0.0 - 1.2 mg/dL Final    Alkaline Phosphatase 01/09/2024 83  44 - 121 IU/L Final    AST (SGOT) 01/09/2024 13  0 - 40 IU/L Final    ALT (SGPT) 01/09/2024 12  0 - 44 IU/L Final    Total Cholesterol 01/09/2024 140  100 - 199 mg/dL Final    Triglycerides 01/09/2024 99  0 - 149 mg/dL Final    HDL Cholesterol 01/09/2024 50  >39 mg/dL Final    VLDL Cholesterol Jono 01/09/2024 18  5 - 40 mg/dL Final    LDL Chol Calc (NIH) 01/09/2024 72  0 - 99 mg/dL Final    LDL/HDL RATIO 01/09/2024 1.4  0.0 - 3.6 ratio Final    Comment:                                     LDL/HDL Ratio                                              Men  Women                                 1/2 Avg.Risk  1.0    1.5                                    Avg.Risk  3.6    3.2                                 2X Avg.Risk  6.2    5.0                                 3X Avg.Risk  8.0    6.1      Hemoglobin A1C 01/09/2024 7.9 (H)  4.8 - 5.6 % Final    Comment:          Prediabetes: 5.7 - 6.4           Diabetes: >6.4           Glycemic control for adults with diabetes: <7.0           Review of Systems   Constitutional: Negative.    HENT: Negative.     Respiratory: Negative.     Cardiovascular: Negative.    Gastrointestinal: Negative.    Genitourinary: Negative.    Musculoskeletal:         Right ankle pain   Skin: Negative.    Neurological: Negative.    Psychiatric/Behavioral: Negative.         Objective   Physical Exam  Constitutional:       Appearance: Normal appearance.   HENT:      Head: Normocephalic.   Cardiovascular:      Rate and Rhythm: Normal rate and regular rhythm.      Pulses: Normal pulses.      Heart sounds: Normal heart sounds.   Pulmonary:      Effort: Pulmonary effort is normal.      Breath sounds: Normal breath sounds.   Abdominal:      General: Bowel sounds are normal.   Musculoskeletal:         General: Swelling present.      Right lower leg: Edema present.   Skin:     General: Skin is warm.   Neurological:      General: No focal deficit present.      Mental Status: He is alert and oriented to person, place, and time.   Psychiatric:         Mood and Affect: Mood normal.         Behavior: Behavior normal.         Procedures    Assessment & Plan   Diagnoses and all orders for this visit:    1. Closed fracture of right ankle with routine healing (Primary)    2. Class 3 severe obesity due to excess calories with serious comorbidity and body mass index (BMI) of 40.0 to 44.9 in adult    Other orders  -     atorvastatin (LIPITOR) 20 MG tablet; Take 1 tablet by mouth every night at bedtime.  Dispense: 90 tablet; Refill: 1  -     glipizide (GLUCOTROL) 5 MG tablet; Take 0.5 tablets by  mouth Daily.  Dispense: 90 tablet; Refill: 1  -     lisinopril (PRINIVIL,ZESTRIL) 10 MG tablet; Take 1 tablet by mouth Daily.  Dispense: 90 tablet; Refill: 1           Current Outpatient Medications:     Accu-Chek Softclix Lancets lancets, Check BS 3 times daily DX CODE E11.9 Pt is Insulin Dependent, Disp: 100 each, Rfl: 12    acetaminophen (TYLENOL) 500 MG tablet, Take 1 tablet by mouth Every 6 (Six) Hours As Needed for Mild Pain., Disp: 60 tablet, Rfl: 2    albuterol sulfate  (90 Base) MCG/ACT inhaler, Inhale 2 puffs Every 4 (Four) Hours As Needed for Wheezing., Disp: 18 g, Rfl: 5    atorvastatin (LIPITOR) 20 MG tablet, Take 1 tablet by mouth every night at bedtime., Disp: 90 tablet, Rfl: 1    Blood Glucose Monitoring Suppl (Accu-Chek Guide Me) w/Device kit, 1 Device Take As Directed. Accu-Chek Guide Me Kit Check BS 3 times a day DX Code E11.9.  Pt is Insulin Dependent, Disp: 1 kit, Rfl: 0    budesonide-formoterol (Symbicort) 80-4.5 MCG/ACT inhaler, Inhale 2 puffs 2 (Two) Times a Day., Disp: 10.2 g, Rfl: 12    butalbital-acetaminophen-caffeine (FIORICET, ESGIC) -40 MG per tablet, Take 1 tablet by mouth Every 4 (Four) Hours As Needed for Headache., Disp: 30 tablet, Rfl: 2    carbidopa-levodopa (Sinemet)  MG per tablet, Take 1 tablet by mouth 3 (Three) Times a Day., Disp: 90 tablet, Rfl: 1    glipizide (GLUCOTROL) 5 MG tablet, Take 0.5 tablets by mouth Daily., Disp: 90 tablet, Rfl: 1    glucose blood (Accu-Chek Guide) test strip, Accu-Chek Guide Test Strips Check BS 3 times a day DX Code E11.9  Pt is Insulin Dependent, Disp: 300 each, Rfl: 5    ibuprofen (ADVIL,MOTRIN) 800 MG tablet, Take 1 tablet by mouth Every 8 (Eight) Hours As Needed for Mild Pain., Disp: 60 tablet, Rfl: 3    insulin aspart (novoLOG FLEXPEN) 100 UNIT/ML solution pen-injector sc pen, Inject 2-4 Units under the skin into the appropriate area as directed 3 (Three) Times a Day With Meals. Sliding Scale Lower than 250 = 2 units  Greater than 250 = 4 units, Disp: 5 pen, Rfl: 5    Insulin Pen Needle (B-D ULTRAFINE III SHORT PEN) 31G X 8 MM misc, Use as directed with Insulin, Disp: 300 each, Rfl: 2    ipratropium-albuterol (DUO-NEB) 0.5-2.5 mg/3 ml nebulizer, Take 3 mL by nebulization Every 4 (Four) Hours As Needed for Wheezing., Disp: 360 mL, Rfl: 6    lisinopril (PRINIVIL,ZESTRIL) 10 MG tablet, Take 1 tablet by mouth Daily., Disp: 90 tablet, Rfl: 1           Radha Ryder, APRN 8/21/2024 14:50 EDT  This note has been electronically signed

## 2025-01-14 ENCOUNTER — TELEPHONE (OUTPATIENT)
Dept: FAMILY MEDICINE CLINIC | Facility: CLINIC | Age: 65
End: 2025-01-14

## 2025-01-14 NOTE — TELEPHONE ENCOUNTER
Caller: MELISSA    Relationship:     Best call back number: [phone]      MELISSA CALLED WITH TRIPLE 3, REQUESTING AN ORDER FOR THIS PATIENT FOR HOME HEALTH IN HOME CARE.     FAX NUMBER TO SEND THE ORDER TO: [phone]      [phone]  NAMED JOSE KOENIG

## 2025-01-16 NOTE — TELEPHONE ENCOUNTER
RELAY  I left detailed vm for patient to call our office and make fasting OFFICE VISIT appt with Radha to discuss home health care.   nylon

## 2025-01-23 ENCOUNTER — TELEPHONE (OUTPATIENT)
Dept: FAMILY MEDICINE CLINIC | Facility: CLINIC | Age: 65
End: 2025-01-23
Payer: MEDICAID

## 2025-01-23 NOTE — TELEPHONE ENCOUNTER
Called kelly back she states he has fallen 3 time in the last 2 weeks but refused to go to the hospital. She states she is trying to get him home health but needs a physician order. I told her he will have to come in to be physical seen in order for medicare/ medicaid to pay. She is going to talk to patient and see if he can get someone to bring him in than she will make him an appointment.

## 2025-01-23 NOTE — TELEPHONE ENCOUNTER
Caller: George Damon    Relationship: Self    Best call back number: [phone]        Who are you requesting to speak with (clinical staff, provider,  specific staff member): PCP    Do you know the name of the person who called: MELISSA    What was the call regarding: JOANIE PUT PATIENT ON [facility]S MEDICATION AND HE STOPPED TAKING IT.  HE IS DOING MUCH WORSE, CAN'T DRIVE HIMSELF TO THE OFFICE BUT NEEDS TO TALK TO JOANIE.  IS THERE ANY WAY SHE CAN DO A PHONE VISIT WITH HIM?  PLEASE CALL MELISSA AT THE NUMBER ABOVE.

## 2025-03-27 ENCOUNTER — OFFICE VISIT (OUTPATIENT)
Dept: FAMILY MEDICINE CLINIC | Facility: CLINIC | Age: 65
End: 2025-03-27
Payer: MEDICAID

## 2025-03-27 VITALS
BODY MASS INDEX: 43.62 KG/M2 | TEMPERATURE: 98.1 F | WEIGHT: 261.8 LBS | OXYGEN SATURATION: 94 % | SYSTOLIC BLOOD PRESSURE: 118 MMHG | HEIGHT: 65 IN | HEART RATE: 88 BPM | DIASTOLIC BLOOD PRESSURE: 70 MMHG

## 2025-03-27 DIAGNOSIS — E78.2 MIXED HYPERLIPIDEMIA: Primary | ICD-10-CM

## 2025-03-27 DIAGNOSIS — E11.9 DIABETES MELLITUS TYPE 2, INSULIN DEPENDENT: ICD-10-CM

## 2025-03-27 DIAGNOSIS — Z79.4 TYPE 2 DIABETES MELLITUS WITH HYPERGLYCEMIA, WITH LONG-TERM CURRENT USE OF INSULIN: ICD-10-CM

## 2025-03-27 DIAGNOSIS — Z00.00 PREVENTATIVE HEALTH CARE: ICD-10-CM

## 2025-03-27 DIAGNOSIS — R25.1 TREMORS OF NERVOUS SYSTEM: ICD-10-CM

## 2025-03-27 DIAGNOSIS — E11.65 TYPE 2 DIABETES MELLITUS WITH HYPERGLYCEMIA, WITH LONG-TERM CURRENT USE OF INSULIN: ICD-10-CM

## 2025-03-27 DIAGNOSIS — Z12.5 SCREENING PSA (PROSTATE SPECIFIC ANTIGEN): ICD-10-CM

## 2025-03-27 DIAGNOSIS — Z79.4 DIABETES MELLITUS TYPE 2, INSULIN DEPENDENT: ICD-10-CM

## 2025-03-27 DIAGNOSIS — H61.23 BILATERAL IMPACTED CERUMEN: ICD-10-CM

## 2025-03-27 DIAGNOSIS — R53.83 OTHER FATIGUE: ICD-10-CM

## 2025-03-27 RX ORDER — BLOOD-GLUCOSE METER
1 EACH MISCELLANEOUS TAKE AS DIRECTED
Qty: 1 KIT | Refills: 0 | Status: SHIPPED | OUTPATIENT
Start: 2025-03-27

## 2025-03-27 RX ORDER — ATORVASTATIN CALCIUM 20 MG/1
20 TABLET, FILM COATED ORAL
Qty: 90 TABLET | Refills: 1 | Status: SHIPPED | OUTPATIENT
Start: 2025-03-27

## 2025-03-27 RX ORDER — BUDESONIDE AND FORMOTEROL FUMARATE DIHYDRATE 80; 4.5 UG/1; UG/1
2 AEROSOL RESPIRATORY (INHALATION)
Qty: 10.2 G | Refills: 12 | Status: SHIPPED | OUTPATIENT
Start: 2025-03-27

## 2025-03-27 RX ORDER — PRIMIDONE 50 MG/1
50 TABLET ORAL 4 TIMES DAILY
Qty: 30 TABLET | Refills: 2 | Status: SHIPPED | OUTPATIENT
Start: 2025-03-27

## 2025-03-27 RX ORDER — GLIPIZIDE 5 MG/1
2.5 TABLET ORAL DAILY
Qty: 90 TABLET | Refills: 1 | Status: SHIPPED | OUTPATIENT
Start: 2025-03-27

## 2025-03-27 RX ORDER — CARBIDOPA/LEVODOPA 10MG-100MG
1 TABLET ORAL 3 TIMES DAILY
Qty: 90 TABLET | Refills: 1 | Status: SHIPPED | OUTPATIENT
Start: 2025-03-27

## 2025-03-27 RX ORDER — IPRATROPIUM BROMIDE AND ALBUTEROL SULFATE 2.5; .5 MG/3ML; MG/3ML
3 SOLUTION RESPIRATORY (INHALATION) EVERY 4 HOURS PRN
Qty: 360 ML | Refills: 6 | Status: SHIPPED | OUTPATIENT
Start: 2025-03-27

## 2025-03-27 RX ORDER — LISINOPRIL 10 MG/1
10 TABLET ORAL DAILY
Qty: 90 TABLET | Refills: 1 | Status: SHIPPED | OUTPATIENT
Start: 2025-03-27

## 2025-03-27 NOTE — PATIENT INSTRUCTIONS
Fasting blood work  Primidone 50 mg nightly  Neurology referral  Debrox eardrops  Return for ear irrigation in 1 week

## 2025-03-27 NOTE — PROGRESS NOTES
"    George Damon is a 64 y.o. male.     History of Present Illness  64-year-old white male with history of type 2 diabetes, COPD, CAD/hypertension, CHF, tremors and hyperlipidemia who comes in today for 6-month follow-up visit    Blood pressure 118/70 heart rate 88 he denies any chest pain, dyspnea, tachycardia or dizziness    Patient's upper extremity tremors are the worst.  He has been on Sinemet but does not seem to be helping.  He never really been diagnosed whether he has Parkinson's or not.  I am going to try him on a trial of primidone and refer him to neurosurgery to get tested    Patient's glucometer is broken I am reordering him diabetic supplies he has not been checking blood sugars for a while.  His last blood sugar was 140 for an A1c of 7.9 and a normal lipid panel will be getting fasting blood work    Patient's ears are impacted and we were not able to irrigate successfully will put him on Debrox eardrops and he will come back to get his ears irrigated again    Weight is 262 with a BMI of 43.6.  He has had 3 COVID vaccines up-to-date on eye exam.  Will be doing PSA with blood work will be due again in March 2025 he still declines colon cancer screening          Fasting blood work  Primidone 50 mg nightly  Neurology referral  Debrox eardrops  Return for ear irrigation in 1 week           The following portions of the patient's history were reviewed and updated as appropriate: allergies, current medications, past family history, past medical history, past social history, past surgical history, and problem list.    Vitals:    03/27/25 1533   BP: 118/70   BP Location: Left arm   Patient Position: Sitting   Cuff Size: Large Adult   Pulse: 88   Temp: 98.1 °F (36.7 °C)   TempSrc: Oral   SpO2: 94%   Weight: 119 kg (261 lb 12.8 oz)   Height: 165.1 cm (65\")       Past Medical History:   Diagnosis Date    Accidental fall     Acute respiratory failure with hypercapnia     CAD (coronary artery disease)     " Congestive heart failure     COPD with acute exacerbation     Diabetes mellitus, type II     Diastolic dysfunction     Enlarged heart     Eye pain, left     GERD (gastroesophageal reflux disease)     (gastric reflex)    Headache     Hyperlipidemia     Hypertension     Knee pain, left     Lumbar back pain     With radiculopathy    Nicotine abuse     Dependence    Nicotine dependence 10/9/2015    Obesity     Onychauxis     Pain in joint of right knee     Pneumonia, bacterial     Rib pain on right side     Toe infection     Wrist pain, left      Past Surgical History:   Procedure Laterality Date    CARDIAC CATHETERIZATION  08/18/2015     Family History   Problem Relation Age of Onset    Diabetes Other     Cancer Other     Heart disease Father      Immunization History   Administered Date(s) Administered    Tdap 05/25/2017       Office Visit on 01/09/2024   Component Date Value Ref Range Status    WBC 01/09/2024 6.9  3.4 - 10.8 x10E3/uL Final    RBC 01/09/2024 5.14  4.14 - 5.80 x10E6/uL Final    Hemoglobin 01/09/2024 14.8  13.0 - 17.7 g/dL Final    Hematocrit 01/09/2024 45.5  37.5 - 51.0 % Final    MCV 01/09/2024 89  79 - 97 fL Final    MCH 01/09/2024 28.8  26.6 - 33.0 pg Final    MCHC 01/09/2024 32.5  31.5 - 35.7 g/dL Final    RDW 01/09/2024 13.7  11.6 - 15.4 % Final    Platelets 01/09/2024 274  150 - 450 x10E3/uL Final    Neutrophil Rel % 01/09/2024 71  Not Estab. % Final    Lymphocyte Rel % 01/09/2024 20  Not Estab. % Final    Monocyte Rel % 01/09/2024 7  Not Estab. % Final    Eosinophil Rel % 01/09/2024 2  Not Estab. % Final    Basophil Rel % 01/09/2024 0  Not Estab. % Final    Neutrophils Absolute 01/09/2024 4.8  1.4 - 7.0 x10E3/uL Final    Lymphocytes Absolute 01/09/2024 1.4  0.7 - 3.1 x10E3/uL Final    Monocytes Absolute 01/09/2024 0.5  0.1 - 0.9 x10E3/uL Final    Eosinophils Absolute 01/09/2024 0.1  0.0 - 0.4 x10E3/uL Final    Basophils Absolute 01/09/2024 0.0  0.0 - 0.2 x10E3/uL Final    Immature Granulocyte  Rel % 01/09/2024 0  Not Estab. % Final    Immature Grans Absolute 01/09/2024 0.0  0.0 - 0.1 x10E3/uL Final    Glucose 01/09/2024 140 (H)  70 - 99 mg/dL Final    BUN 01/09/2024 15  8 - 27 mg/dL Final    Creatinine 01/09/2024 0.74 (L)  0.76 - 1.27 mg/dL Final    EGFR Result 01/09/2024 103  >59 mL/min/1.73 Final    BUN/Creatinine Ratio 01/09/2024 20  10 - 24 Final    Sodium 01/09/2024 137  134 - 144 mmol/L Final    Potassium 01/09/2024 4.9  3.5 - 5.2 mmol/L Final    Chloride 01/09/2024 100  96 - 106 mmol/L Final    Total CO2 01/09/2024 23  20 - 29 mmol/L Final    Calcium 01/09/2024 9.4  8.6 - 10.2 mg/dL Final    Total Protein 01/09/2024 6.6  6.0 - 8.5 g/dL Final    Albumin 01/09/2024 4.4  3.9 - 4.9 g/dL Final    Globulin 01/09/2024 2.2  1.5 - 4.5 g/dL Final    A/G Ratio 01/09/2024 2.0  1.2 - 2.2 Final    Total Bilirubin 01/09/2024 0.4  0.0 - 1.2 mg/dL Final    Alkaline Phosphatase 01/09/2024 83  44 - 121 IU/L Final    AST (SGOT) 01/09/2024 13  0 - 40 IU/L Final    ALT (SGPT) 01/09/2024 12  0 - 44 IU/L Final    Total Cholesterol 01/09/2024 140  100 - 199 mg/dL Final    Triglycerides 01/09/2024 99  0 - 149 mg/dL Final    HDL Cholesterol 01/09/2024 50  >39 mg/dL Final    VLDL Cholesterol Jono 01/09/2024 18  5 - 40 mg/dL Final    LDL Chol Calc (NIH) 01/09/2024 72  0 - 99 mg/dL Final    LDL/HDL RATIO 01/09/2024 1.4  0.0 - 3.6 ratio Final    Comment:                                     LDL/HDL Ratio                                              Men  Women                                1/2 Avg.Risk  1.0    1.5                                    Avg.Risk  3.6    3.2                                 2X Avg.Risk  6.2    5.0                                 3X Avg.Risk  8.0    6.1      Hemoglobin A1C 01/09/2024 7.9 (H)  4.8 - 5.6 % Final    Comment:          Prediabetes: 5.7 - 6.4           Diabetes: >6.4           Glycemic control for adults with diabetes: <7.0           Review of Systems   Constitutional: Negative.    HENT:   Positive for hearing loss.    Respiratory: Negative.     Cardiovascular: Negative.    Gastrointestinal: Negative.    Genitourinary: Negative.    Musculoskeletal: Negative.    Skin: Negative.    Neurological:  Positive for tremors.   Psychiatric/Behavioral: Negative.         Objective   Physical Exam  Constitutional:       Appearance: Normal appearance.   HENT:      Head: Normocephalic.      Ears:      Comments: Cerumen impaction bilaterally  Cardiovascular:      Rate and Rhythm: Normal rate and regular rhythm.      Pulses: Normal pulses.      Heart sounds: Normal heart sounds.   Pulmonary:      Effort: Pulmonary effort is normal.      Breath sounds: Normal breath sounds.   Abdominal:      General: Bowel sounds are normal.   Musculoskeletal:         General: Normal range of motion.   Skin:     General: Skin is warm.   Neurological:      General: No focal deficit present.      Mental Status: He is alert and oriented to person, place, and time.      Comments: Intention tremors upper extremities   Psychiatric:         Mood and Affect: Mood normal.         Behavior: Behavior normal.         Ear Cerumen Removal    Date/Time: 3/27/2025 4:20 PM    Performed by: Radha Ryder APRN  Authorized by: Radha Ryder APRN    Anesthesia:  Local Anesthetic: none  Location details: left ear and right ear  Patient tolerance: patient tolerated the procedure well with no immediate complications  Procedure type: irrigation   Sedation:  Patient sedated: no            Assessment & Plan   Diagnoses and all orders for this visit:    1. Mixed hyperlipidemia (Primary)  -     Cancel: Lipid Panel With LDL / HDL Ratio  -     Lipid Panel With LDL / HDL Ratio; Future    2. Diabetes mellitus type 2, insulin dependent    3. Type 2 diabetes mellitus with hyperglycemia, with long-term current use of insulin  -     Cancel: Comprehensive Metabolic Panel  -     Cancel: Hemoglobin A1c  -     Comprehensive Metabolic Panel; Future  -     Hemoglobin A1c;  Future    4. Preventative health care  -     Cancel: CBC & Differential  -     CBC & Differential; Future    5. Other fatigue  -     Cancel: TSH+Free T4  -     Cancel: T3  -     TSH+Free T4; Future  -     T3; Future    6. Screening PSA (prostate specific antigen)  -     Cancel: PSA Screen  -     PSA Screen; Future    7. Tremors of nervous system  -     Ambulatory Referral to Neurology    Other orders  -     primidone (MYSOLINE) 50 MG tablet; Take 1 tablet by mouth 4 (Four) Times a Day.  Dispense: 30 tablet; Refill: 2  -     atorvastatin (LIPITOR) 20 MG tablet; Take 1 tablet by mouth every night at bedtime.  Dispense: 90 tablet; Refill: 1  -     budesonide-formoterol (Symbicort) 80-4.5 MCG/ACT inhaler; Inhale 2 puffs 2 (Two) Times a Day.  Dispense: 10.2 g; Refill: 12  -     carbidopa-levodopa (Sinemet)  MG per tablet; Take 1 tablet by mouth 3 (Three) Times a Day.  Dispense: 90 tablet; Refill: 1  -     glipizide (GLUCOTROL) 5 MG tablet; Take 0.5 tablets by mouth Daily.  Dispense: 90 tablet; Refill: 1  -     insulin aspart (novoLOG FLEXPEN) 100 UNIT/ML solution pen-injector sc pen; Inject 2-4 Units under the skin into the appropriate area as directed 3 (Three) Times a Day With Meals. Sliding Scale  Lower than 250 = 2 units  Greater than 250 = 4 units  Dispense: 3 mL; Refill: 2  -     ipratropium-albuterol (DUO-NEB) 0.5-2.5 mg/3 ml nebulizer; Take 3 mL by nebulization Every 4 (Four) Hours As Needed for Wheezing.  Dispense: 360 mL; Refill: 6  -     lisinopril (PRINIVIL,ZESTRIL) 10 MG tablet; Take 1 tablet by mouth Daily.  Dispense: 90 tablet; Refill: 1  -     Blood Glucose Monitoring Suppl (Accu-Chek Guide Me) w/Device kit; Use 1 Device Take As Directed. Accu-Chek Guide Me Kit Check BS 3 times a day DX Code E11.9.  Pt is Insulin Dependent  Indications: patient needs accucheck meter and supplies  Dispense: 1 kit; Refill: 0  -     carbamide peroxide (Debrox) 6.5 % otic solution; Administer 5 drops into both ears 2  (Two) Times a Day.  Dispense: 15 mL; Refill: 0           Current Outpatient Medications:     Accu-Chek Softclix Lancets lancets, Check BS 3 times daily DX CODE E11.9 Pt is Insulin Dependent, Disp: 100 each, Rfl: 12    acetaminophen (TYLENOL) 500 MG tablet, Take 1 tablet by mouth Every 6 (Six) Hours As Needed for Mild Pain., Disp: 60 tablet, Rfl: 2    albuterol sulfate  (90 Base) MCG/ACT inhaler, Inhale 2 puffs Every 4 (Four) Hours As Needed for Wheezing., Disp: 18 g, Rfl: 5    atorvastatin (LIPITOR) 20 MG tablet, Take 1 tablet by mouth every night at bedtime., Disp: 90 tablet, Rfl: 1    Blood Glucose Monitoring Suppl (Accu-Chek Guide Me) w/Device kit, Use 1 Device Take As Directed. Accu-Chek Guide Me Kit Check BS 3 times a day DX Code E11.9.  Pt is Insulin Dependent  Indications: patient needs accucheck meter and supplies, Disp: 1 kit, Rfl: 0    budesonide-formoterol (Symbicort) 80-4.5 MCG/ACT inhaler, Inhale 2 puffs 2 (Two) Times a Day., Disp: 10.2 g, Rfl: 12    butalbital-acetaminophen-caffeine (FIORICET, ESGIC) -40 MG per tablet, Take 1 tablet by mouth Every 4 (Four) Hours As Needed for Headache., Disp: 30 tablet, Rfl: 2    carbidopa-levodopa (Sinemet)  MG per tablet, Take 1 tablet by mouth 3 (Three) Times a Day., Disp: 90 tablet, Rfl: 1    glipizide (GLUCOTROL) 5 MG tablet, Take 0.5 tablets by mouth Daily., Disp: 90 tablet, Rfl: 1    glucose blood (Accu-Chek Guide) test strip, Accu-Chek Guide Test Strips Check BS 3 times a day DX Code E11.9  Pt is Insulin Dependent, Disp: 300 each, Rfl: 5    ibuprofen (ADVIL,MOTRIN) 800 MG tablet, Take 1 tablet by mouth Every 8 (Eight) Hours As Needed for Mild Pain., Disp: 60 tablet, Rfl: 3    insulin aspart (novoLOG FLEXPEN) 100 UNIT/ML solution pen-injector sc pen, Inject 2-4 Units under the skin into the appropriate area as directed 3 (Three) Times a Day With Meals. Sliding Scale Lower than 250 = 2 units Greater than 250 = 4 units, Disp: 3 mL, Rfl: 2     Insulin Pen Needle (B-D ULTRAFINE III SHORT PEN) 31G X 8 MM misc, Use as directed with Insulin, Disp: 300 each, Rfl: 2    ipratropium-albuterol (DUO-NEB) 0.5-2.5 mg/3 ml nebulizer, Take 3 mL by nebulization Every 4 (Four) Hours As Needed for Wheezing., Disp: 360 mL, Rfl: 6    lisinopril (PRINIVIL,ZESTRIL) 10 MG tablet, Take 1 tablet by mouth Daily., Disp: 90 tablet, Rfl: 1    carbamide peroxide (Debrox) 6.5 % otic solution, Administer 5 drops into both ears 2 (Two) Times a Day., Disp: 15 mL, Rfl: 0    primidone (MYSOLINE) 50 MG tablet, Take 1 tablet by mouth 4 (Four) Times a Day., Disp: 30 tablet, Rfl: 2           Radha Ryder, APRN 3/27/2025 16:17 EDT  This note has been electronically signed

## 2025-04-02 ENCOUNTER — TELEPHONE (OUTPATIENT)
Dept: FAMILY MEDICINE CLINIC | Facility: CLINIC | Age: 65
End: 2025-04-02

## 2025-04-02 NOTE — TELEPHONE ENCOUNTER
Caller: [facility]--MELISSA GARCIA    Best call back number: [phone]    Patient is needing:     WOULD LIKE A CALL FROM [facility] TO DISCUSS PATIENT'S APPOINTMENT FROM 3.27.25.     STATES PATIENT WAS VERY DISCOURAGED AFTER LEAVING THE APPOINTMENT AND THAT HE WAS GIVEN ERRONEOUS INFORMATION PERTAINING TO THE [place] MEDICAID WAIVER PROGRAM.     ALSO, THAT THE PRESCRIPTION FOR PRIMIDONE WAS SENT IN WITH CONFUSING DOSING INSTRUCTIONS AND WOULD LIKE TO CLARIFY, OR MAKE SOMEONE AWARE OF THAT.     SHE WILL BE SENDING A RELEASE VIA FAX TO THE OFFICE AS WELL.

## 2025-04-03 ENCOUNTER — RESULTS FOLLOW-UP (OUTPATIENT)
Dept: FAMILY MEDICINE CLINIC | Facility: CLINIC | Age: 65
End: 2025-04-03
Payer: MEDICAID

## 2025-04-03 NOTE — TELEPHONE ENCOUNTER
Hub relay    So blood sugar much much higher at 310 and A1c 13.4.  Need to make sure this was fasting if it was what is he currently taking for his blood sugar  Actually rest of labs are stable.  I did call him in a glucometer and supplies from our last visit

## 2025-04-07 RX ORDER — GLIPIZIDE 5 MG/1
5 TABLET ORAL
Qty: 180 TABLET | Refills: 1 | Status: SHIPPED | OUTPATIENT
Start: 2025-04-07